# Patient Record
Sex: MALE | Race: WHITE | NOT HISPANIC OR LATINO | URBAN - METROPOLITAN AREA
[De-identification: names, ages, dates, MRNs, and addresses within clinical notes are randomized per-mention and may not be internally consistent; named-entity substitution may affect disease eponyms.]

---

## 2017-01-16 ENCOUNTER — INPATIENT (INPATIENT)
Facility: HOSPITAL | Age: 47
LOS: 6 days | Discharge: HOME | End: 2017-01-23
Attending: INTERNAL MEDICINE | Admitting: INTERNAL MEDICINE
Payer: MEDICAID

## 2017-01-16 VITALS
HEART RATE: 105 BPM | RESPIRATION RATE: 18 BRPM | DIASTOLIC BLOOD PRESSURE: 106 MMHG | TEMPERATURE: 98 F | SYSTOLIC BLOOD PRESSURE: 158 MMHG | OXYGEN SATURATION: 99 %

## 2017-01-16 DIAGNOSIS — R21 RASH AND OTHER NONSPECIFIC SKIN ERUPTION: ICD-10-CM

## 2017-01-16 DIAGNOSIS — K57.92 DIVERTICULITIS OF INTESTINE, PART UNSPECIFIED, WITHOUT PERFORATION OR ABSCESS WITHOUT BLEEDING: Chronic | ICD-10-CM

## 2017-01-16 DIAGNOSIS — Z98.89 OTHER SPECIFIED POSTPROCEDURAL STATES: Chronic | ICD-10-CM

## 2017-01-16 LAB
ALBUMIN SERPL ELPH-MCNC: 4.1 G/DL — SIGNIFICANT CHANGE UP (ref 3.3–5)
ALP SERPL-CCNC: 91 U/L — SIGNIFICANT CHANGE UP (ref 40–120)
ALT FLD-CCNC: 24 U/L — SIGNIFICANT CHANGE UP (ref 4–41)
AST SERPL-CCNC: 59 U/L — HIGH (ref 4–40)
BASE EXCESS BLDV CALC-SCNC: 3.6 MMOL/L — SIGNIFICANT CHANGE UP
BASOPHILS # BLD AUTO: 0.03 K/UL — SIGNIFICANT CHANGE UP (ref 0–0.2)
BASOPHILS NFR BLD AUTO: 0.4 % — SIGNIFICANT CHANGE UP (ref 0–2)
BILIRUB SERPL-MCNC: 0.4 MG/DL — SIGNIFICANT CHANGE UP (ref 0.2–1.2)
BLOOD GAS VENOUS - CREATININE: 0.77 MG/DL — SIGNIFICANT CHANGE UP (ref 0.5–1.3)
BUN SERPL-MCNC: 5 MG/DL — LOW (ref 7–23)
CALCIUM SERPL-MCNC: 8.9 MG/DL — SIGNIFICANT CHANGE UP (ref 8.4–10.5)
CHLORIDE BLDV-SCNC: 101 MMOL/L — SIGNIFICANT CHANGE UP (ref 96–108)
CHLORIDE SERPL-SCNC: 96 MMOL/L — LOW (ref 98–107)
CO2 SERPL-SCNC: 23 MMOL/L — SIGNIFICANT CHANGE UP (ref 22–31)
CREAT SERPL-MCNC: 0.8 MG/DL — SIGNIFICANT CHANGE UP (ref 0.5–1.3)
EOSINOPHIL # BLD AUTO: 0.17 K/UL — SIGNIFICANT CHANGE UP (ref 0–0.5)
EOSINOPHIL NFR BLD AUTO: 2.3 % — SIGNIFICANT CHANGE UP (ref 0–6)
GAS PNL BLDV: 136 MMOL/L — SIGNIFICANT CHANGE UP (ref 136–146)
GLUCOSE BLDV-MCNC: 73 — SIGNIFICANT CHANGE UP (ref 70–99)
GLUCOSE SERPL-MCNC: 75 MG/DL — SIGNIFICANT CHANGE UP (ref 70–99)
HCO3 BLDV-SCNC: 27 MMOL/L — SIGNIFICANT CHANGE UP (ref 20–27)
HCT VFR BLD CALC: 40.6 % — SIGNIFICANT CHANGE UP (ref 39–50)
HCT VFR BLDV CALC: 44.7 % — SIGNIFICANT CHANGE UP (ref 39–51)
HGB BLD-MCNC: 14.1 G/DL — SIGNIFICANT CHANGE UP (ref 13–17)
HGB BLDV-MCNC: 14.6 G/DL — SIGNIFICANT CHANGE UP (ref 13–17)
IMM GRANULOCYTES NFR BLD AUTO: 0.5 % — SIGNIFICANT CHANGE UP (ref 0–1.5)
LACTATE BLDV-MCNC: 2.2 MMOL/L — HIGH (ref 0.5–2)
LYMPHOCYTES # BLD AUTO: 2.31 K/UL — SIGNIFICANT CHANGE UP (ref 1–3.3)
LYMPHOCYTES # BLD AUTO: 30.8 % — SIGNIFICANT CHANGE UP (ref 13–44)
MCHC RBC-ENTMCNC: 33.3 PG — SIGNIFICANT CHANGE UP (ref 27–34)
MCHC RBC-ENTMCNC: 34.7 % — SIGNIFICANT CHANGE UP (ref 32–36)
MCV RBC AUTO: 96 FL — SIGNIFICANT CHANGE UP (ref 80–100)
MONOCYTES # BLD AUTO: 0.7 K/UL — SIGNIFICANT CHANGE UP (ref 0–0.9)
MONOCYTES NFR BLD AUTO: 9.3 % — SIGNIFICANT CHANGE UP (ref 2–14)
NEUTROPHILS # BLD AUTO: 4.25 K/UL — SIGNIFICANT CHANGE UP (ref 1.8–7.4)
NEUTROPHILS NFR BLD AUTO: 56.7 % — SIGNIFICANT CHANGE UP (ref 43–77)
PCO2 BLDV: 45 MMHG — SIGNIFICANT CHANGE UP (ref 41–51)
PH BLDV: 7.41 PH — SIGNIFICANT CHANGE UP (ref 7.32–7.43)
PLATELET # BLD AUTO: 201 K/UL — SIGNIFICANT CHANGE UP (ref 150–400)
PMV BLD: 10.5 FL — SIGNIFICANT CHANGE UP (ref 7–13)
PO2 BLDV: 48 MMHG — HIGH (ref 35–40)
POTASSIUM BLDV-SCNC: 3.9 MMOL/L — SIGNIFICANT CHANGE UP (ref 3.4–4.5)
POTASSIUM SERPL-MCNC: SIGNIFICANT CHANGE UP MMOL/L (ref 3.5–5.3)
POTASSIUM SERPL-SCNC: SIGNIFICANT CHANGE UP MMOL/L (ref 3.5–5.3)
PROT SERPL-MCNC: 7.9 G/DL — SIGNIFICANT CHANGE UP (ref 6–8.3)
RBC # BLD: 4.23 M/UL — SIGNIFICANT CHANGE UP (ref 4.2–5.8)
RBC # FLD: 12.8 % — SIGNIFICANT CHANGE UP (ref 10.3–14.5)
SAO2 % BLDV: 78.4 % — SIGNIFICANT CHANGE UP (ref 60–85)
SODIUM SERPL-SCNC: 136 MMOL/L — SIGNIFICANT CHANGE UP (ref 135–145)
WBC # BLD: 7.5 K/UL — SIGNIFICANT CHANGE UP (ref 3.8–10.5)
WBC # FLD AUTO: 7.5 K/UL — SIGNIFICANT CHANGE UP (ref 3.8–10.5)

## 2017-01-16 PROCEDURE — 99223 1ST HOSP IP/OBS HIGH 75: CPT | Mod: GC

## 2017-01-16 RX ORDER — ENOXAPARIN SODIUM 100 MG/ML
40 INJECTION SUBCUTANEOUS EVERY 24 HOURS
Qty: 0 | Refills: 0 | Status: DISCONTINUED | OUTPATIENT
Start: 2017-01-16 | End: 2017-01-23

## 2017-01-16 RX ADMIN — Medication 25 MILLIGRAM(S): at 18:42

## 2017-01-16 NOTE — ED PROVIDER NOTE - OBJECTIVE STATEMENT
46 year old male with no PMHx c/o BLE pruritic wounds for the past 2 weeks. Admits to having this in the past due to allergic reactions. The patient broke out in a rash that started distally on lower extremities and radiated proximally to thighs and on BUE. Pt has had allergy testing in the past which showed "many allergens". Recently switched body wash recently but has discontinued the use after rash started. Admits to penile swelling and redness. Admits to left ear pain for the past 2 days. Denies oral mucosa involvement, no rash on palms and soles of feet, no F/C/N/V, new sexual partners, joint pain, new foods. 46 year old male with PMHx of ETOH abuse c/o BLE pruritic wounds for the past 2 weeks. Admits to having this in the past due to allergic reactions. The patient broke out in a rash that started distally on lower extremities and radiated proximally to thighs and on BUE. Pt has had allergy testing in the past which showed "many allergens". Recently switched body wash recently but has discontinued the use after rash started. Admits to penile swelling and redness. Admits to left ear pain for the past 2 days. Denies oral mucosa involvement, no rash on palms and soles of feet, no F/C/N/V, new sexual partners, joint pain, new foods. 46 year old male with PMHx of ETOH abuse c/o BLE pruritic wounds for the past 2 weeks. Admits to having this in the past due to allergic reactions. The patient broke out in a rash that started distally on lower extremities 2 weeks ago and radiated proximally to thighs and on BUE 4 days ago. Pt has had allergy testing in the past which showed "many allergens". Recently switched body wash recently but has discontinued the use after rash started. Admits to penile swelling and redness for 4 days. Admits to left ear pain for the past 2 days. Denies oral mucosa involvement, no rash on palms and soles of feet, no F/C/N/V, new sexual partners, joint pain, new foods.

## 2017-01-16 NOTE — ED PROVIDER NOTE - PSYCHIATRIC NEGATIVE STATEMENT, MLM
hx of anxiety, was on meds including remeron but has not taken them in over 1 month for insurance reasons

## 2017-01-16 NOTE — ED ADULT TRIAGE NOTE - CHIEF COMPLAINT QUOTE
Pt c/o RLE excoriation and rash on arms, legs, and torso for 1-2 weeks.  Also c/o L ear having water in it and penis is sore and red.  States he's had this rash and excoriation previously 2-3 years ago and was diagnosed with fungal infection.  Wound red, open, with serous drainage.  Appears comfortable.

## 2017-01-16 NOTE — ED PROVIDER NOTE - SKIN, MLM
right foot has scaly excoriations and redness, diffuse papules on the arms and legs, some lesions on the abdomen

## 2017-01-16 NOTE — ED PROVIDER NOTE - MEDICAL DECISION MAKING DETAILS
scaly excoriations to left foot with redness and diffuse papular rash, maybe ID reaction from superinfected fungal/scaly lesion, also phimosis, will check labs, derm consult.

## 2017-01-16 NOTE — ED PROVIDER NOTE - ATTENDING CONTRIBUTION TO CARE
ELTON Hess MD: Patient seen and evaluated, presents to the ED with rash on the right foot that is very itchy and now is infected, very painful too. Now pt has diffuse papules over his legs and arms that are not itchy. He denies fever, n, v. He had something similar in the past and was treated for fungal but then he was told that it was not fungal and was given a steroid cream that worked. Pt is daily drinker, withdraws with shaking and anxiety when he does not drink but does not get hallucinations, DTs or seizures. PE lungs clear, s1s2, abd SNT, scaly lesions with superinfection in the right foot and papular rash on the legs and arms and some lesions on the abdomen. Penis; Foreskin is not retractable but urethral meatus is visible, fissure on the foreskin. IMP probably superinfected fungal infection with ID reaction and some alcohol withdrawal. Will treat with IV AB, Librium, derm to see patient in the morning and decide on antifungal if needed, pt with 30 yr hx of alcohol use and US that showed liver disease in the past.

## 2017-01-17 DIAGNOSIS — Z41.8 ENCOUNTER FOR OTHER PROCEDURES FOR PURPOSES OTHER THAN REMEDYING HEALTH STATE: ICD-10-CM

## 2017-01-17 DIAGNOSIS — R63.8 OTHER SYMPTOMS AND SIGNS CONCERNING FOOD AND FLUID INTAKE: ICD-10-CM

## 2017-01-17 DIAGNOSIS — F17.200 NICOTINE DEPENDENCE, UNSPECIFIED, UNCOMPLICATED: ICD-10-CM

## 2017-01-17 DIAGNOSIS — R21 RASH AND OTHER NONSPECIFIC SKIN ERUPTION: ICD-10-CM

## 2017-01-17 DIAGNOSIS — F10.10 ALCOHOL ABUSE, UNCOMPLICATED: ICD-10-CM

## 2017-01-17 LAB
ALBUMIN SERPL ELPH-MCNC: 4.2 G/DL — SIGNIFICANT CHANGE UP (ref 3.3–5)
ALBUMIN SERPL ELPH-MCNC: 4.2 G/DL — SIGNIFICANT CHANGE UP (ref 3.3–5)
ALP SERPL-CCNC: 105 U/L — SIGNIFICANT CHANGE UP (ref 40–120)
ALP SERPL-CCNC: 105 U/L — SIGNIFICANT CHANGE UP (ref 40–120)
ALT FLD-CCNC: 15 U/L — SIGNIFICANT CHANGE UP (ref 4–41)
ALT FLD-CCNC: 15 U/L — SIGNIFICANT CHANGE UP (ref 4–41)
AMPHET UR-MCNC: NEGATIVE — SIGNIFICANT CHANGE UP
AST SERPL-CCNC: 32 U/L — SIGNIFICANT CHANGE UP (ref 4–40)
AST SERPL-CCNC: 32 U/L — SIGNIFICANT CHANGE UP (ref 4–40)
BARBITURATES UR SCN-MCNC: NEGATIVE — SIGNIFICANT CHANGE UP
BASOPHILS # BLD AUTO: 0.02 K/UL — SIGNIFICANT CHANGE UP (ref 0–0.2)
BASOPHILS NFR BLD AUTO: 0.3 % — SIGNIFICANT CHANGE UP (ref 0–2)
BENZODIAZ UR-MCNC: POSITIVE — SIGNIFICANT CHANGE UP
BILIRUB DIRECT SERPL-MCNC: 0.2 MG/DL — SIGNIFICANT CHANGE UP (ref 0.1–0.2)
BILIRUB SERPL-MCNC: 0.9 MG/DL — SIGNIFICANT CHANGE UP (ref 0.2–1.2)
BILIRUB SERPL-MCNC: 0.9 MG/DL — SIGNIFICANT CHANGE UP (ref 0.2–1.2)
BUN SERPL-MCNC: 5 MG/DL — LOW (ref 7–23)
CALCIUM SERPL-MCNC: 9.2 MG/DL — SIGNIFICANT CHANGE UP (ref 8.4–10.5)
CANNABINOIDS UR-MCNC: POSITIVE — SIGNIFICANT CHANGE UP
CHLORIDE SERPL-SCNC: 99 MMOL/L — SIGNIFICANT CHANGE UP (ref 98–107)
CO2 SERPL-SCNC: 26 MMOL/L — SIGNIFICANT CHANGE UP (ref 22–31)
COCAINE METAB.OTHER UR-MCNC: NEGATIVE — SIGNIFICANT CHANGE UP
CREAT SERPL-MCNC: 0.83 MG/DL — SIGNIFICANT CHANGE UP (ref 0.5–1.3)
EOSINOPHIL # BLD AUTO: 0.01 K/UL — SIGNIFICANT CHANGE UP (ref 0–0.5)
EOSINOPHIL NFR BLD AUTO: 0.2 % — SIGNIFICANT CHANGE UP (ref 0–6)
ETHANOL BLD-MCNC: < 10 MG/DL — SIGNIFICANT CHANGE UP
GLUCOSE SERPL-MCNC: 90 MG/DL — SIGNIFICANT CHANGE UP (ref 70–99)
HCT VFR BLD CALC: 45.8 % — SIGNIFICANT CHANGE UP (ref 39–50)
HGB BLD-MCNC: 15.4 G/DL — SIGNIFICANT CHANGE UP (ref 13–17)
IMM GRANULOCYTES NFR BLD AUTO: 0.7 % — SIGNIFICANT CHANGE UP (ref 0–1.5)
LACTATE SERPL-SCNC: 0.9 MMOL/L — SIGNIFICANT CHANGE UP (ref 0.5–2)
LYMPHOCYTES # BLD AUTO: 0.89 K/UL — LOW (ref 1–3.3)
LYMPHOCYTES # BLD AUTO: 14.6 % — SIGNIFICANT CHANGE UP (ref 13–44)
MAGNESIUM SERPL-MCNC: 1.8 MG/DL — SIGNIFICANT CHANGE UP (ref 1.6–2.6)
MCHC RBC-ENTMCNC: 33 PG — SIGNIFICANT CHANGE UP (ref 27–34)
MCHC RBC-ENTMCNC: 33.6 % — SIGNIFICANT CHANGE UP (ref 32–36)
MCV RBC AUTO: 98.1 FL — SIGNIFICANT CHANGE UP (ref 80–100)
METHADONE UR-MCNC: NEGATIVE — SIGNIFICANT CHANGE UP
MONOCYTES # BLD AUTO: 0.11 K/UL — SIGNIFICANT CHANGE UP (ref 0–0.9)
MONOCYTES NFR BLD AUTO: 1.8 % — LOW (ref 2–14)
NEUTROPHILS # BLD AUTO: 5.04 K/UL — SIGNIFICANT CHANGE UP (ref 1.8–7.4)
NEUTROPHILS NFR BLD AUTO: 82.4 % — HIGH (ref 43–77)
OPIATES UR-MCNC: NEGATIVE — SIGNIFICANT CHANGE UP
OXYCODONE UR-MCNC: NEGATIVE — SIGNIFICANT CHANGE UP
PCP UR-MCNC: NEGATIVE — SIGNIFICANT CHANGE UP
PHOSPHATE SERPL-MCNC: 2.7 MG/DL — SIGNIFICANT CHANGE UP (ref 2.5–4.5)
PLATELET # BLD AUTO: 200 K/UL — SIGNIFICANT CHANGE UP (ref 150–400)
PMV BLD: 11 FL — SIGNIFICANT CHANGE UP (ref 7–13)
POTASSIUM SERPL-MCNC: 4.7 MMOL/L — SIGNIFICANT CHANGE UP (ref 3.5–5.3)
POTASSIUM SERPL-SCNC: 4.7 MMOL/L — SIGNIFICANT CHANGE UP (ref 3.5–5.3)
PROT SERPL-MCNC: 7.8 G/DL — SIGNIFICANT CHANGE UP (ref 6–8.3)
PROT SERPL-MCNC: 7.8 G/DL — SIGNIFICANT CHANGE UP (ref 6–8.3)
RBC # BLD: 4.67 M/UL — SIGNIFICANT CHANGE UP (ref 4.2–5.8)
RBC # FLD: 13.2 % — SIGNIFICANT CHANGE UP (ref 10.3–14.5)
SODIUM SERPL-SCNC: 139 MMOL/L — SIGNIFICANT CHANGE UP (ref 135–145)
SPECIMEN SOURCE: SIGNIFICANT CHANGE UP
SPECIMEN SOURCE: SIGNIFICANT CHANGE UP
WBC # BLD: 6.11 K/UL — SIGNIFICANT CHANGE UP (ref 3.8–10.5)
WBC # FLD AUTO: 6.11 K/UL — SIGNIFICANT CHANGE UP (ref 3.8–10.5)

## 2017-01-17 PROCEDURE — 99233 SBSQ HOSP IP/OBS HIGH 50: CPT

## 2017-01-17 PROCEDURE — 99223 1ST HOSP IP/OBS HIGH 75: CPT | Mod: GC

## 2017-01-17 PROCEDURE — 99222 1ST HOSP IP/OBS MODERATE 55: CPT | Mod: GC

## 2017-01-17 RX ORDER — SODIUM CHLORIDE 9 MG/ML
1000 INJECTION, SOLUTION INTRAVENOUS
Qty: 0 | Refills: 0 | Status: DISCONTINUED | OUTPATIENT
Start: 2017-01-17 | End: 2017-01-20

## 2017-01-17 RX ORDER — DIPHENHYDRAMINE HCL 50 MG
25 CAPSULE ORAL EVERY 6 HOURS
Qty: 0 | Refills: 0 | Status: DISCONTINUED | OUTPATIENT
Start: 2017-01-17 | End: 2017-01-23

## 2017-01-17 RX ORDER — MUPIROCIN 20 MG/G
1 OINTMENT TOPICAL
Qty: 0 | Refills: 0 | Status: DISCONTINUED | OUTPATIENT
Start: 2017-01-17 | End: 2017-01-23

## 2017-01-17 RX ORDER — SODIUM CHLORIDE 9 MG/ML
1000 INJECTION, SOLUTION INTRAVENOUS
Qty: 0 | Refills: 0 | Status: DISCONTINUED | OUTPATIENT
Start: 2017-01-17 | End: 2017-01-17

## 2017-01-17 RX ORDER — NICOTINE POLACRILEX 2 MG
1 GUM BUCCAL DAILY
Qty: 0 | Refills: 0 | Status: DISCONTINUED | OUTPATIENT
Start: 2017-01-17 | End: 2017-01-23

## 2017-01-17 RX ORDER — SODIUM CHLORIDE 9 MG/ML
1000 INJECTION INTRAMUSCULAR; INTRAVENOUS; SUBCUTANEOUS
Qty: 0 | Refills: 0 | Status: DISCONTINUED | OUTPATIENT
Start: 2017-01-17 | End: 2017-01-18

## 2017-01-17 RX ORDER — ACETAMINOPHEN 500 MG
650 TABLET ORAL EVERY 6 HOURS
Qty: 0 | Refills: 0 | Status: DISCONTINUED | OUTPATIENT
Start: 2017-01-17 | End: 2017-01-23

## 2017-01-17 RX ADMIN — Medication 2 MILLIGRAM(S): at 03:41

## 2017-01-17 RX ADMIN — Medication 40 MILLIGRAM(S): at 10:15

## 2017-01-17 RX ADMIN — SODIUM CHLORIDE 100 MILLILITER(S): 9 INJECTION, SOLUTION INTRAVENOUS at 01:54

## 2017-01-17 RX ADMIN — Medication 1.5 MILLIGRAM(S): at 22:17

## 2017-01-17 RX ADMIN — ENOXAPARIN SODIUM 40 MILLIGRAM(S): 100 INJECTION SUBCUTANEOUS at 01:25

## 2017-01-17 RX ADMIN — Medication 2 MILLIGRAM(S): at 01:25

## 2017-01-17 RX ADMIN — SODIUM CHLORIDE 100 MILLILITER(S): 9 INJECTION INTRAMUSCULAR; INTRAVENOUS; SUBCUTANEOUS at 22:18

## 2017-01-17 RX ADMIN — Medication 2 MILLIGRAM(S): at 07:39

## 2017-01-17 RX ADMIN — Medication 2 MILLIGRAM(S): at 11:00

## 2017-01-17 RX ADMIN — Medication 60 MILLIGRAM(S): at 01:51

## 2017-01-17 RX ADMIN — Medication 2 MILLIGRAM(S): at 14:43

## 2017-01-17 RX ADMIN — Medication 1 TABLET(S): at 11:00

## 2017-01-17 RX ADMIN — Medication 25 MILLIGRAM(S): at 01:25

## 2017-01-17 RX ADMIN — Medication 40 MILLIGRAM(S): at 16:51

## 2017-01-17 RX ADMIN — Medication 1 APPLICATION(S): at 22:19

## 2017-01-17 RX ADMIN — Medication 2 MILLIGRAM(S): at 19:09

## 2017-01-17 NOTE — H&P ADULT. - HISTORY OF PRESENT ILLNESS
47 y/o M with hx of ETOH abuse complains of body rash for 2 weeks. Pt has had this in the past. The patient broke out in a rash that started on lower extremities 2 weeks ago and radiated proximally to thighs reached the upper extremities 4 days ago. Pt has had allergy testing in the past which showed he was allergic to a lot of things. Recently switched body wash recently but has discontinued the use after rash started. Admits to penile swelling and redness for 4 days. Denies oral mucosa involvement, no rash on palms and soles of feet, no f/c, no n/v/d, no sexual active right now     In ED, vitals wnl.   Labs significant for mild transaminitis, lactate 2.1. 47 y/o M with hx of ETOH abuse complains of body rash for 2 weeks. Pt has had this in the past. The patient broke out in a rash that started on lower extremities 2 weeks ago and radiated proximally to thighs reached the upper extremities 4 days ago. Pt has had allergy testing in the past which showed he was allergic to a lot of things. Recently switched body wash recently but has discontinued the use after rash started. Admits to penile swelling and redness for 4 days. Denies oral mucosa involvement, no rash on palms and soles of feet, no f/c, no n/v/d, no sexual active right now. Pt says he has extreme pruritis on his feet, and has scratched excessively to the point of bleeding.     In ED, vitals wnl.   Labs significant for mild transaminitis, lactate 2.1. 45 y/o M with hx of ETOH abuse complains of body rash for 2 weeks. Pt has had this in the past. The patient broke out in a rash that started on lower extremities 2 weeks ago and radiated proximally to thighs reached the upper extremities 4 days ago. Pt has had allergy testing in the past which showed he was allergic to a lot of things. Recently switched body wash recently but has discontinued the use after rash started. Admits to penile swelling and redness for 4 days. Denies oral mucosa involvement, no rash on palms and soles of feet, no f/c, no n/v/d, no sexual active right now. Pt says he has extreme pruritis on his feet, and has scratched excessively to the point of bleeding.     Pt denies any seizures on ICU stay for alcohol withdrawal. Has been hospitalized in the past however.     In ED, vitals wnl.   Labs significant for mild transaminitis, lactate 2.1. 47 y/o M with hx of ETOH abuse complains of body rash for 2 weeks. Pt has had this in the past. The patient broke out in a rash that started on lower extremities 2 weeks ago and radiated proximally to thighs reached the upper extremities 4 days ago. Pt has had allergy testing in the past which showed he was allergic to a lot of things. Recently switched body wash recently but has discontinued the use after rash started. Admits to penile swelling and redness for 4 days. Denies oral mucosa involvement, no rash on palms and soles of feet, no f/c, no n/v/d, no sexual active right now. Pt says he has extreme pruritis on his feet, and has scratched excessively to the point of bleeding. Also admits to penile swelling with rash.     Pt denies any seizures on ICU stay for alcohol withdrawal. Has been hospitalized in the past however.     In ED, vitals wnl.   Labs significant for mild transaminitis, lactate 2.1. 47 y/o M with hx of ETOH abuse complains of body rash for 2 weeks. Pt has had this in the past. The patient broke out in a rash that started on lower extremities 2 weeks ago and radiated proximally to thighs reached the upper extremities 4 days ago. Pt has had allergy testing in the past which showed he was allergic to a lot of things. Recently switched body wash recently but has discontinued the use after rash started. Admits to penile swelling and redness for 4 days. Denies oral mucosa involvement, no rash on palms and soles of feet, no f/c, no n/v/d, no sexual active right now. Pt says he has extreme pruritis on his feet (R significantly > L), and has scratched excessively to the point of bleeding. Small rashes also on B/L upper extremities.  Also admits to penile swelling with rash.     Pt denies any seizures on ICU stay for alcohol withdrawal. Has been hospitalized in the past however.     In ED, vitals wnl.   Labs significant for mild transaminitis, lactate 2.1.

## 2017-01-17 NOTE — H&P ADULT. - FAMILY HISTORY
Father  Still living? Unknown  Family history of stomach cancer, Age at diagnosis: Age Unknown     Uncle  Still living? Unknown  Family history of stomach cancer, Age at diagnosis: Age Unknown

## 2017-01-17 NOTE — H&P ADULT. - ASSESSMENT
47 y/o M with hx of ETOH abuse complains of body rash for 2 weeks 2/2 to possible allergic reactions, with excoriations of R foot with possible superimposed cellulitis.

## 2017-01-17 NOTE — H&P ADULT. - PROBLEM SELECTOR PLAN 1
-possible allergic reactions given hx of allergies  -derm consulted  -will give benadryl PRN for itching  -given excoriation and redness on R foot, will tx for superimposed cellulitis with doxycycline -possible allergic reactions given hx of allergies  -derm consulted  -will give benadryl PRN for itching; one dose of IV solumedrol 60 mg  -given no fevers, or leukocytosis, will not add abx for now -possible allergic reactions given hx of allergies  - may be due to contact, also reported new bodywash usage for 1 day 2 weeks prior  - no history of food sensitivity  - no eosinophilia  -derm consulted  -will give benadryl PRN for itching; IV solumedrol 60 mg x 3 doses (then re-evaluate)  -given no fevers, or leukocytosis, will not add abx for now (prescribed doxycycline and levofloxacin n the ED); consider ID consult if necessary  -percocet PRN

## 2017-01-17 NOTE — H&P ADULT. - PROBLEM SELECTOR PLAN 3
-lovenox    diet: regular    Dispo: per social work. - not motivated to quit  - nicotine patch prescribed  - continue to encourage smoking cessation if receptive

## 2017-01-17 NOTE — H&P ADULT. - SKIN COMMENTS
R excoriations, redness. fine maculopapular rash on 4 extremities, faintly in trunk. R excoriations, redness. fine maculopapular rash on 4 extremities, faintly in trunk - (most pronounced on dorsum of R-pedal area)

## 2017-01-17 NOTE — H&P ADULT. - PROBLEM SELECTOR PLAN 2
-pt hospitalized in past for alcohol withdrawal, but no hx of severe symptoms  -will put on ativan taper, and ativan PRN JESSEWA protocol  -banana bag, MV  -social work consult -pt hospitalized in past for alcohol withdrawal, but no hx of severe symptoms  -will put on ativan taper, and ativan PRN [facility] protocol  -banana bag, MV/FA/Thiamine; maintenance IVF thereafter  -social work consult

## 2017-01-18 LAB
HBV CORE AB SER-ACNC: NONREACTIVE — SIGNIFICANT CHANGE UP
HIV1 AG SER QL: SIGNIFICANT CHANGE UP
HIV1+2 AB SPEC QL: SIGNIFICANT CHANGE UP
T PALLIDUM AB TITR SER: NEGATIVE — SIGNIFICANT CHANGE UP

## 2017-01-18 PROCEDURE — 99233 SBSQ HOSP IP/OBS HIGH 50: CPT

## 2017-01-18 RX ORDER — THIAMINE MONONITRATE (VIT B1) 100 MG
100 TABLET ORAL DAILY
Qty: 0 | Refills: 0 | Status: DISCONTINUED | OUTPATIENT
Start: 2017-01-18 | End: 2017-01-20

## 2017-01-18 RX ORDER — FOLIC ACID 0.8 MG
1 TABLET ORAL DAILY
Qty: 0 | Refills: 0 | Status: DISCONTINUED | OUTPATIENT
Start: 2017-01-18 | End: 2017-01-23

## 2017-01-18 RX ADMIN — MUPIROCIN 1 APPLICATION(S): 20 OINTMENT TOPICAL at 18:12

## 2017-01-18 RX ADMIN — Medication 1.5 MILLIGRAM(S): at 01:53

## 2017-01-18 RX ADMIN — Medication 1.5 MILLIGRAM(S): at 06:29

## 2017-01-18 RX ADMIN — Medication 100 MILLIGRAM(S): at 13:20

## 2017-01-18 RX ADMIN — Medication 1.5 MILLIGRAM(S): at 10:27

## 2017-01-18 RX ADMIN — Medication 1.5 MILLIGRAM(S): at 18:09

## 2017-01-18 RX ADMIN — Medication 1.5 MILLIGRAM(S): at 13:20

## 2017-01-18 RX ADMIN — Medication 1 MILLIGRAM(S): at 13:20

## 2017-01-18 RX ADMIN — MUPIROCIN 1 APPLICATION(S): 20 OINTMENT TOPICAL at 06:30

## 2017-01-18 RX ADMIN — Medication 1 APPLICATION(S): at 10:28

## 2017-01-18 RX ADMIN — Medication 1 TABLET(S): at 12:48

## 2017-01-18 RX ADMIN — Medication 1 APPLICATION(S): at 21:58

## 2017-01-18 RX ADMIN — Medication 1 MILLIGRAM(S): at 21:57

## 2017-01-19 PROCEDURE — 99232 SBSQ HOSP IP/OBS MODERATE 35: CPT

## 2017-01-19 PROCEDURE — 99233 SBSQ HOSP IP/OBS HIGH 50: CPT

## 2017-01-19 PROCEDURE — 99223 1ST HOSP IP/OBS HIGH 75: CPT

## 2017-01-19 RX ORDER — THIAMINE MONONITRATE (VIT B1) 100 MG
500 TABLET ORAL THREE TIMES A DAY
Qty: 0 | Refills: 0 | Status: COMPLETED | OUTPATIENT
Start: 2017-01-19 | End: 2017-01-22

## 2017-01-19 RX ORDER — HALOPERIDOL DECANOATE 100 MG/ML
5 INJECTION INTRAMUSCULAR EVERY 6 HOURS
Qty: 0 | Refills: 0 | Status: DISCONTINUED | OUTPATIENT
Start: 2017-01-19 | End: 2017-01-23

## 2017-01-19 RX ADMIN — Medication 1 APPLICATION(S): at 21:34

## 2017-01-19 RX ADMIN — Medication 2.5 MILLIGRAM(S): at 18:03

## 2017-01-19 RX ADMIN — Medication 1 TABLET(S): at 11:44

## 2017-01-19 RX ADMIN — Medication 1 MILLIGRAM(S): at 09:39

## 2017-01-19 RX ADMIN — Medication 2.5 MILLIGRAM(S): at 21:41

## 2017-01-19 RX ADMIN — Medication 1 MILLIGRAM(S): at 06:12

## 2017-01-19 RX ADMIN — Medication 2 MILLIGRAM(S): at 04:32

## 2017-01-19 RX ADMIN — Medication 25 MILLIGRAM(S): at 21:41

## 2017-01-19 RX ADMIN — Medication 105 MILLIGRAM(S): at 22:59

## 2017-01-19 RX ADMIN — Medication 1 MILLIGRAM(S): at 11:45

## 2017-01-19 RX ADMIN — Medication 100 MILLIGRAM(S): at 11:45

## 2017-01-19 RX ADMIN — Medication 1 PATCH: at 11:45

## 2017-01-19 RX ADMIN — Medication 1 MILLIGRAM(S): at 14:13

## 2017-01-19 RX ADMIN — MUPIROCIN 1 APPLICATION(S): 20 OINTMENT TOPICAL at 17:20

## 2017-01-19 RX ADMIN — MUPIROCIN 1 APPLICATION(S): 20 OINTMENT TOPICAL at 06:12

## 2017-01-19 RX ADMIN — Medication 1 APPLICATION(S): at 09:38

## 2017-01-19 RX ADMIN — Medication 2.5 MILLIGRAM(S): at 15:16

## 2017-01-19 RX ADMIN — Medication 1 MILLIGRAM(S): at 01:21

## 2017-01-20 LAB
M TB TUBERC IFN-G BLD QL: -0.01 IU/ML — SIGNIFICANT CHANGE UP
M TB TUBERC IFN-G BLD QL: 0.02 IU/ML — SIGNIFICANT CHANGE UP
M TB TUBERC IFN-G BLD QL: NEGATIVE — SIGNIFICANT CHANGE UP
MITOGEN IGNF BCKGRD COR BLD-ACNC: 6.38 IU/ML — SIGNIFICANT CHANGE UP

## 2017-01-20 PROCEDURE — 99232 SBSQ HOSP IP/OBS MODERATE 35: CPT

## 2017-01-20 RX ADMIN — Medication 105 MILLIGRAM(S): at 14:52

## 2017-01-20 RX ADMIN — Medication 2.5 MILLIGRAM(S): at 05:20

## 2017-01-20 RX ADMIN — Medication 1 APPLICATION(S): at 21:49

## 2017-01-20 RX ADMIN — Medication 1 TABLET(S): at 12:59

## 2017-01-20 RX ADMIN — MUPIROCIN 1 APPLICATION(S): 20 OINTMENT TOPICAL at 05:20

## 2017-01-20 RX ADMIN — Medication 2.5 MILLIGRAM(S): at 11:33

## 2017-01-20 RX ADMIN — Medication 2 MILLIGRAM(S): at 21:50

## 2017-01-20 RX ADMIN — Medication 2 MILLIGRAM(S): at 18:40

## 2017-01-20 RX ADMIN — Medication 25 MILLIGRAM(S): at 05:22

## 2017-01-20 RX ADMIN — Medication 1 APPLICATION(S): at 11:34

## 2017-01-20 RX ADMIN — Medication 105 MILLIGRAM(S): at 21:49

## 2017-01-20 RX ADMIN — Medication 105 MILLIGRAM(S): at 05:19

## 2017-01-20 RX ADMIN — MUPIROCIN 1 APPLICATION(S): 20 OINTMENT TOPICAL at 18:40

## 2017-01-20 RX ADMIN — Medication 25 MILLIGRAM(S): at 21:49

## 2017-01-20 RX ADMIN — Medication 2 MILLIGRAM(S): at 14:52

## 2017-01-20 RX ADMIN — Medication 1 MILLIGRAM(S): at 13:00

## 2017-01-20 RX ADMIN — Medication 2.5 MILLIGRAM(S): at 02:30

## 2017-01-20 RX ADMIN — Medication 1 PATCH: at 13:01

## 2017-01-21 LAB
BACTERIA BLD CULT: SIGNIFICANT CHANGE UP
BACTERIA BLD CULT: SIGNIFICANT CHANGE UP

## 2017-01-21 PROCEDURE — 99233 SBSQ HOSP IP/OBS HIGH 50: CPT

## 2017-01-21 RX ADMIN — Medication 25 MILLIGRAM(S): at 06:12

## 2017-01-21 RX ADMIN — Medication 1 MILLIGRAM(S): at 22:26

## 2017-01-21 RX ADMIN — Medication 2 MILLIGRAM(S): at 03:15

## 2017-01-21 RX ADMIN — Medication 1 MILLIGRAM(S): at 13:01

## 2017-01-21 RX ADMIN — Medication 25 MILLIGRAM(S): at 21:09

## 2017-01-21 RX ADMIN — Medication 1 TABLET(S): at 13:01

## 2017-01-21 RX ADMIN — Medication 1 MILLIGRAM(S): at 18:37

## 2017-01-21 RX ADMIN — Medication 1 APPLICATION(S): at 21:09

## 2017-01-21 RX ADMIN — MUPIROCIN 1 APPLICATION(S): 20 OINTMENT TOPICAL at 06:08

## 2017-01-21 RX ADMIN — Medication 105 MILLIGRAM(S): at 22:26

## 2017-01-21 RX ADMIN — Medication 2 MILLIGRAM(S): at 06:08

## 2017-01-21 RX ADMIN — Medication 105 MILLIGRAM(S): at 06:08

## 2017-01-21 RX ADMIN — MUPIROCIN 1 APPLICATION(S): 20 OINTMENT TOPICAL at 18:38

## 2017-01-21 RX ADMIN — Medication 1 APPLICATION(S): at 10:24

## 2017-01-21 RX ADMIN — Medication 105 MILLIGRAM(S): at 14:24

## 2017-01-21 RX ADMIN — Medication 1 MILLIGRAM(S): at 14:29

## 2017-01-21 RX ADMIN — Medication 2 MILLIGRAM(S): at 10:23

## 2017-01-22 PROCEDURE — 99233 SBSQ HOSP IP/OBS HIGH 50: CPT

## 2017-01-22 RX ADMIN — Medication 1 TABLET(S): at 11:02

## 2017-01-22 RX ADMIN — Medication 0.5 MILLIGRAM(S): at 14:35

## 2017-01-22 RX ADMIN — Medication 1 MILLIGRAM(S): at 02:15

## 2017-01-22 RX ADMIN — Medication 105 MILLIGRAM(S): at 06:10

## 2017-01-22 RX ADMIN — Medication 105 MILLIGRAM(S): at 14:36

## 2017-01-22 RX ADMIN — Medication 1 APPLICATION(S): at 11:02

## 2017-01-22 RX ADMIN — Medication 25 MILLIGRAM(S): at 19:21

## 2017-01-22 RX ADMIN — MUPIROCIN 1 APPLICATION(S): 20 OINTMENT TOPICAL at 06:11

## 2017-01-22 RX ADMIN — Medication 0.5 MILLIGRAM(S): at 19:22

## 2017-01-22 RX ADMIN — MUPIROCIN 1 APPLICATION(S): 20 OINTMENT TOPICAL at 19:22

## 2017-01-22 RX ADMIN — Medication 25 MILLIGRAM(S): at 06:11

## 2017-01-22 RX ADMIN — Medication 1 MILLIGRAM(S): at 11:01

## 2017-01-22 RX ADMIN — Medication 1 MILLIGRAM(S): at 11:02

## 2017-01-22 RX ADMIN — Medication 0.5 MILLIGRAM(S): at 22:35

## 2017-01-22 RX ADMIN — Medication 1 MILLIGRAM(S): at 06:10

## 2017-01-22 RX ADMIN — Medication 1 APPLICATION(S): at 22:35

## 2017-01-23 ENCOUNTER — TRANSCRIPTION ENCOUNTER (OUTPATIENT)
Age: 47
End: 2017-01-23

## 2017-01-23 VITALS
RESPIRATION RATE: 18 BRPM | OXYGEN SATURATION: 100 % | TEMPERATURE: 98 F | DIASTOLIC BLOOD PRESSURE: 77 MMHG | HEART RATE: 85 BPM | SYSTOLIC BLOOD PRESSURE: 116 MMHG

## 2017-01-23 LAB
ALBUMIN SERPL ELPH-MCNC: 3.8 G/DL — SIGNIFICANT CHANGE UP (ref 3.3–5)
ALP SERPL-CCNC: 67 U/L — SIGNIFICANT CHANGE UP (ref 40–120)
ALT FLD-CCNC: 16 U/L — SIGNIFICANT CHANGE UP (ref 4–41)
AST SERPL-CCNC: 16 U/L — SIGNIFICANT CHANGE UP (ref 4–40)
BILIRUB SERPL-MCNC: 0.2 MG/DL — SIGNIFICANT CHANGE UP (ref 0.2–1.2)
BUN SERPL-MCNC: 18 MG/DL — SIGNIFICANT CHANGE UP (ref 7–23)
CALCIUM SERPL-MCNC: 9.4 MG/DL — SIGNIFICANT CHANGE UP (ref 8.4–10.5)
CHLORIDE SERPL-SCNC: 106 MMOL/L — SIGNIFICANT CHANGE UP (ref 98–107)
CO2 SERPL-SCNC: 21 MMOL/L — LOW (ref 22–31)
CREAT SERPL-MCNC: 0.82 MG/DL — SIGNIFICANT CHANGE UP (ref 0.5–1.3)
GLUCOSE SERPL-MCNC: 86 MG/DL — SIGNIFICANT CHANGE UP (ref 70–99)
HCT VFR BLD CALC: 43.2 % — SIGNIFICANT CHANGE UP (ref 39–50)
HGB BLD-MCNC: 14.3 G/DL — SIGNIFICANT CHANGE UP (ref 13–17)
MCHC RBC-ENTMCNC: 32.9 PG — SIGNIFICANT CHANGE UP (ref 27–34)
MCHC RBC-ENTMCNC: 33.1 % — SIGNIFICANT CHANGE UP (ref 32–36)
MCV RBC AUTO: 99.3 FL — SIGNIFICANT CHANGE UP (ref 80–100)
PLATELET # BLD AUTO: 240 K/UL — SIGNIFICANT CHANGE UP (ref 150–400)
PMV BLD: 11.1 FL — SIGNIFICANT CHANGE UP (ref 7–13)
POTASSIUM SERPL-MCNC: 4 MMOL/L — SIGNIFICANT CHANGE UP (ref 3.5–5.3)
POTASSIUM SERPL-SCNC: 4 MMOL/L — SIGNIFICANT CHANGE UP (ref 3.5–5.3)
PROT SERPL-MCNC: 6.8 G/DL — SIGNIFICANT CHANGE UP (ref 6–8.3)
RBC # BLD: 4.35 M/UL — SIGNIFICANT CHANGE UP (ref 4.2–5.8)
RBC # FLD: 13.1 % — SIGNIFICANT CHANGE UP (ref 10.3–14.5)
SODIUM SERPL-SCNC: 141 MMOL/L — SIGNIFICANT CHANGE UP (ref 135–145)
WBC # BLD: 8.21 K/UL — SIGNIFICANT CHANGE UP (ref 3.8–10.5)
WBC # FLD AUTO: 8.21 K/UL — SIGNIFICANT CHANGE UP (ref 3.8–10.5)

## 2017-01-23 PROCEDURE — 99239 HOSP IP/OBS DSCHRG MGMT >30: CPT

## 2017-01-23 RX ORDER — THIAMINE MONONITRATE (VIT B1) 100 MG
100 TABLET ORAL DAILY
Qty: 0 | Refills: 0 | Status: DISCONTINUED | OUTPATIENT
Start: 2017-01-23 | End: 2017-01-23

## 2017-01-23 RX ORDER — MUPIROCIN 20 MG/G
1 OINTMENT TOPICAL
Qty: 1 | Refills: 0 | OUTPATIENT
Start: 2017-01-23 | End: 2017-02-06

## 2017-01-23 RX ORDER — DIPHENHYDRAMINE HCL 50 MG
1 CAPSULE ORAL
Qty: 0 | Refills: 0 | COMMUNITY
Start: 2017-01-23

## 2017-01-23 RX ORDER — DIPHENHYDRAMINE HCL 50 MG
1 CAPSULE ORAL
Qty: 12 | Refills: 0 | OUTPATIENT
Start: 2017-01-23 | End: 2017-01-26

## 2017-01-23 RX ORDER — MUPIROCIN 20 MG/G
1 OINTMENT TOPICAL
Qty: 0 | Refills: 0 | COMMUNITY
Start: 2017-01-23

## 2017-01-23 RX ADMIN — Medication 0.5 MILLIGRAM(S): at 02:29

## 2017-01-23 RX ADMIN — Medication 100 MILLIGRAM(S): at 14:05

## 2017-01-23 RX ADMIN — Medication 1 MILLIGRAM(S): at 12:31

## 2017-01-23 RX ADMIN — Medication 1 APPLICATION(S): at 10:07

## 2017-01-23 RX ADMIN — MUPIROCIN 1 APPLICATION(S): 20 OINTMENT TOPICAL at 06:05

## 2017-01-23 RX ADMIN — Medication 1 TABLET(S): at 12:31

## 2017-01-23 RX ADMIN — Medication 0.5 MILLIGRAM(S): at 10:06

## 2017-01-23 RX ADMIN — Medication 0.5 MILLIGRAM(S): at 06:05

## 2017-01-23 NOTE — DISCHARGE NOTE ADULT - CARE PROVIDERS DIRECT ADDRESSES
[email or web address]

## 2017-01-23 NOTE — DISCHARGE NOTE ADULT - MEDICATION SUMMARY - MEDICATIONS TO STOP TAKING
I will STOP taking the medications listed below when I get home from the hospital:    triamcinolone 0.1% topical ointment  -- 1 application on skin once a day

## 2017-01-23 NOTE — DISCHARGE NOTE ADULT - PATIENT PORTAL LINK FT
“You can access the [facility] Patient Portal, offered by [facility], by registering with the following website: [email or web address]”

## 2017-01-23 NOTE — DISCHARGE NOTE ADULT - PLAN OF CARE
Resolution of rash You were seen by the dermatology team and started on topical steriods. Please continue topical medications as prescribed. Please follow up with dermatology outpatient within 2 weeks of discharge. Abstain from alcohol. You were seen by the dermatology team and started on topical steroids. Please continue topical medications as prescribed. Please follow up with dermatology outpatient within 2 weeks of discharge.

## 2017-01-23 NOTE — PROVIDER CONTACT NOTE (OTHER) - SITUATION
Patient IV site symptomatic, not flushing. Patient is refusing iv insertion at this time. he states we are not using it, he doesn't need one.

## 2017-01-23 NOTE — DISCHARGE NOTE ADULT - MEDICATION SUMMARY - MEDICATIONS TO TAKE
I will START or STAY ON the medications listed below when I get home from the hospital:    mirtazapine 15 mg oral tablet  -- 1 tab(s) by mouth once a day (at bedtime)  -- Indication: For Sleep Aide    diphenhydrAMINE 25 mg oral capsule  -- 1 cap(s) by mouth every 6 hours, As needed, Rash and/or Itching  -- Indication: For Rash    mupirocin 2% topical ointment  -- 1 application on skin 2 times a day  -- Indication: For Rash    clobetasol 0.05% topical ointment  -- 1 application on skin 2 times a day Stop after 13 days  -- Indication: For Rash    lactobacillus acidophilus oral capsule  -- 1 tab(s) by mouth 2 times a day  -- Indication: For prophylaxis    Multiple Vitamins oral tablet  -- 1 tab(s) by mouth once a day  -- Indication: For Vitamin    folic acid 1 mg oral tablet  -- 1 tab(s) by mouth once a day  -- Indication: For Vitamin    thiamine 100 mg oral tablet  -- 1 tab(s) by mouth once a day  -- Indication: For Vitamin

## 2017-01-23 NOTE — DISCHARGE NOTE ADULT - HOSPITAL COURSE
47 y/o M with hx of ETOH abuse, diverticulitis arrived with complaint of  body rash for 2 weeks secondary to possible allergic reactions, with excoriations of right foot with possible superimposed cellulitis. Given no fevers or leukocytosis antibiotics  held. Dermatology consulted and made recommendations for topical creams. Given excoriation and redness on right  foot he was treated for  superimposed cellulitis with doxycycline. Follow up with Dermatology Dr. Booker within 2 weeks of discharge.     ETOH abuse  Patient hospitalized in the past for alcohol withdrawal. Psychiatry consulted and he was placed  on an ativan taper. Patient is to continue multivitamins, thiamine and folate. He was offered a list of alcohol rehab programs but declined.    Patient is medically stable for discharge home.

## 2017-01-23 NOTE — DISCHARGE NOTE ADULT - CARE PLAN
Principal Discharge DX:	Rash  Goal:	Resolution of rash  Instructions for follow-up, activity and diet:	You were seen by the dermatology team and started on topical steriods. Please continue topical medications as prescribed. Please follow up with dermatology outpatient within 2 weeks of discharge.  Secondary Diagnosis:	ETOH abuse  Instructions for follow-up, activity and diet:	Abstain from alcohol. Principal Discharge DX:	Rash  Goal:	Resolution of rash  Instructions for follow-up, activity and diet:	You were seen by the dermatology team and started on topical steroids. Please continue topical medications as prescribed. Please follow up with dermatology outpatient within 2 weeks of discharge.  Secondary Diagnosis:	ETOH abuse  Instructions for follow-up, activity and diet:	Abstain from alcohol.

## 2017-02-08 ENCOUNTER — INPATIENT (INPATIENT)
Facility: HOSPITAL | Age: 47
LOS: 20 days | Discharge: OTHER INSTITUTION | End: 2017-03-01
Attending: HOSPITALIST | Admitting: HOSPITALIST
Payer: MEDICAID

## 2017-02-08 VITALS
HEART RATE: 98 BPM | TEMPERATURE: 99 F | DIASTOLIC BLOOD PRESSURE: 107 MMHG | SYSTOLIC BLOOD PRESSURE: 153 MMHG | RESPIRATION RATE: 17 BRPM | OXYGEN SATURATION: 96 %

## 2017-02-08 DIAGNOSIS — F10.230 ALCOHOL DEPENDENCE WITH WITHDRAWAL, UNCOMPLICATED: ICD-10-CM

## 2017-02-08 DIAGNOSIS — R21 RASH AND OTHER NONSPECIFIC SKIN ERUPTION: ICD-10-CM

## 2017-02-08 DIAGNOSIS — K57.92 DIVERTICULITIS OF INTESTINE, PART UNSPECIFIED, WITHOUT PERFORATION OR ABSCESS WITHOUT BLEEDING: Chronic | ICD-10-CM

## 2017-02-08 DIAGNOSIS — Z98.89 OTHER SPECIFIED POSTPROCEDURAL STATES: Chronic | ICD-10-CM

## 2017-02-08 DIAGNOSIS — E51.2 WERNICKE'S ENCEPHALOPATHY: ICD-10-CM

## 2017-02-08 DIAGNOSIS — F10.10 ALCOHOL ABUSE, UNCOMPLICATED: ICD-10-CM

## 2017-02-08 LAB
ALBUMIN SERPL ELPH-MCNC: 4.1 G/DL — SIGNIFICANT CHANGE UP (ref 3.3–5)
ALP SERPL-CCNC: 91 U/L — SIGNIFICANT CHANGE UP (ref 40–120)
ALT FLD-CCNC: 56 U/L — HIGH (ref 4–41)
AST SERPL-CCNC: 85 U/L — HIGH (ref 4–40)
BASOPHILS # BLD AUTO: 0.02 K/UL — SIGNIFICANT CHANGE UP (ref 0–0.2)
BASOPHILS NFR BLD AUTO: 0.4 % — SIGNIFICANT CHANGE UP (ref 0–2)
BILIRUB SERPL-MCNC: 0.2 MG/DL — SIGNIFICANT CHANGE UP (ref 0.2–1.2)
BUN SERPL-MCNC: 5 MG/DL — LOW (ref 7–23)
CALCIUM SERPL-MCNC: 8.9 MG/DL — SIGNIFICANT CHANGE UP (ref 8.4–10.5)
CHLORIDE SERPL-SCNC: 99 MMOL/L — SIGNIFICANT CHANGE UP (ref 98–107)
CO2 SERPL-SCNC: 25 MMOL/L — SIGNIFICANT CHANGE UP (ref 22–31)
CREAT SERPL-MCNC: 0.76 MG/DL — SIGNIFICANT CHANGE UP (ref 0.5–1.3)
EOSINOPHIL # BLD AUTO: 0.26 K/UL — SIGNIFICANT CHANGE UP (ref 0–0.5)
EOSINOPHIL NFR BLD AUTO: 4.9 % — SIGNIFICANT CHANGE UP (ref 0–6)
GLUCOSE SERPL-MCNC: 77 MG/DL — SIGNIFICANT CHANGE UP (ref 70–99)
HCT VFR BLD CALC: 40.7 % — SIGNIFICANT CHANGE UP (ref 39–50)
HGB BLD-MCNC: 14.1 G/DL — SIGNIFICANT CHANGE UP (ref 13–17)
IMM GRANULOCYTES NFR BLD AUTO: 0.4 % — SIGNIFICANT CHANGE UP (ref 0–1.5)
LYMPHOCYTES # BLD AUTO: 2.45 K/UL — SIGNIFICANT CHANGE UP (ref 1–3.3)
LYMPHOCYTES # BLD AUTO: 46.1 % — HIGH (ref 13–44)
MAGNESIUM SERPL-MCNC: 1.9 MG/DL — SIGNIFICANT CHANGE UP (ref 1.6–2.6)
MCHC RBC-ENTMCNC: 32.6 PG — SIGNIFICANT CHANGE UP (ref 27–34)
MCHC RBC-ENTMCNC: 34.6 % — SIGNIFICANT CHANGE UP (ref 32–36)
MCV RBC AUTO: 94.2 FL — SIGNIFICANT CHANGE UP (ref 80–100)
MONOCYTES # BLD AUTO: 0.53 K/UL — SIGNIFICANT CHANGE UP (ref 0–0.9)
MONOCYTES NFR BLD AUTO: 10 % — SIGNIFICANT CHANGE UP (ref 2–14)
NEUTROPHILS # BLD AUTO: 2.04 K/UL — SIGNIFICANT CHANGE UP (ref 1.8–7.4)
NEUTROPHILS NFR BLD AUTO: 38.2 % — LOW (ref 43–77)
PHOSPHATE SERPL-MCNC: 4.6 MG/DL — HIGH (ref 2.5–4.5)
PLATELET # BLD AUTO: 160 K/UL — SIGNIFICANT CHANGE UP (ref 150–400)
PMV BLD: 11.2 FL — SIGNIFICANT CHANGE UP (ref 7–13)
POTASSIUM SERPL-MCNC: 4.2 MMOL/L — SIGNIFICANT CHANGE UP (ref 3.5–5.3)
POTASSIUM SERPL-SCNC: 4.2 MMOL/L — SIGNIFICANT CHANGE UP (ref 3.5–5.3)
PROT SERPL-MCNC: 7.4 G/DL — SIGNIFICANT CHANGE UP (ref 6–8.3)
RBC # BLD: 4.32 M/UL — SIGNIFICANT CHANGE UP (ref 4.2–5.8)
RBC # FLD: 13.2 % — SIGNIFICANT CHANGE UP (ref 10.3–14.5)
SODIUM SERPL-SCNC: 144 MMOL/L — SIGNIFICANT CHANGE UP (ref 135–145)
WBC # BLD: 5.32 K/UL — SIGNIFICANT CHANGE UP (ref 3.8–10.5)
WBC # FLD AUTO: 5.32 K/UL — SIGNIFICANT CHANGE UP (ref 3.8–10.5)

## 2017-02-08 PROCEDURE — 99223 1ST HOSP IP/OBS HIGH 75: CPT

## 2017-02-08 PROCEDURE — 71010: CPT | Mod: 26

## 2017-02-08 RX ORDER — SODIUM CHLORIDE 9 MG/ML
1000 INJECTION, SOLUTION INTRAVENOUS
Qty: 0 | Refills: 0 | Status: COMPLETED | OUTPATIENT
Start: 2017-02-08 | End: 2017-02-08

## 2017-02-08 RX ORDER — FOLIC ACID 0.8 MG
1 TABLET ORAL DAILY
Qty: 0 | Refills: 0 | Status: DISCONTINUED | OUTPATIENT
Start: 2017-02-08 | End: 2017-02-11

## 2017-02-08 RX ORDER — SODIUM CHLORIDE 9 MG/ML
1000 INJECTION, SOLUTION INTRAVENOUS
Qty: 0 | Refills: 0 | Status: DISCONTINUED | OUTPATIENT
Start: 2017-02-08 | End: 2017-02-08

## 2017-02-08 RX ORDER — THIAMINE MONONITRATE (VIT B1) 100 MG
500 TABLET ORAL EVERY 8 HOURS
Qty: 0 | Refills: 0 | Status: COMPLETED | OUTPATIENT
Start: 2017-02-08 | End: 2017-02-10

## 2017-02-08 RX ADMIN — Medication 2 MILLIGRAM(S): at 22:30

## 2017-02-08 RX ADMIN — Medication 50 MILLIGRAM(S): at 22:46

## 2017-02-08 RX ADMIN — SODIUM CHLORIDE 250 MILLILITER(S): 9 INJECTION, SOLUTION INTRAVENOUS at 21:17

## 2017-02-08 NOTE — ED ADULT TRIAGE NOTE - CHIEF COMPLAINT QUOTE
Patient c/o painful rash to b/l UE, LE and back. Discharged from [facility] 1/23/17 after admission for same with dermatology follow up. However, patients medicaid has not gone through and was unable to follow up or get prescriptions. Admits drinking 4-5 beers around 11am today. Patient c/o painful rash to b/l UE, LE and back. Discharged from [facility] 1/23/17 after admission for same with dermatology follow up. However, patients medicaid has not gone through and was unable to follow up or get prescriptions. History of ETOH abuse- Admits drinking 4-5 beers around 11am today.

## 2017-02-08 NOTE — H&P ADULT. - PROBLEM SELECTOR PLAN 3
- Etiology unclear  - Seen by derm on prior visit. Pt states rash has gotten worse. WIll continue creams recommended by derm and reconsult derm in am for further recommendation  - Would avoid PO or IV benadryl while pt receiving ativan to avoid oversedation. Will monitor clinically and try topical management - Etiology unclear  - Seen by derm on prior visit. Pt states rash has gotten worse and no improvement with topical treamtent recommended by derm   - Would reconsult derm in am for further recommendation  - Would avoid PO or IV benadryl while pt receiving ativan to avoid oversedation. Will monitor clinically and try topical management if needed

## 2017-02-08 NOTE — H&P ADULT. - PROBLEM SELECTOR PLAN 2
- positive nystagmus and ataxia concerning for Wernicke's encephalopathy  - Will start high dose thiamine and monitor clinically

## 2017-02-08 NOTE — ED ADULT NURSE NOTE - OBJECTIVE STATEMENT
Pt received to Intake rm 6 aaox3 ambulatory c/o itching painful rash.    Dry peeling rash with scabs noted to Back and Bilateral upper and lower extremities.  Pt reports this has been on and off for 18 years with this episode beginning 4 weeks ago.  Pt reports ETOH abuse, states "I drink a 12 pack (of beer) a day."  Pt reports his last drink was at noon today.  Pt is anxious and tremulous CIWA score of 11 as noted, though itching may be attributed to rash.  Pt reports smoking 8-10 cigarettes per day but denies need for nicotine patch.  Pt denies hx of seizures or hallucinations.  Pt reports allergy to zosyn and clindamycin.  Will continue to monitor.

## 2017-02-08 NOTE — H&P ADULT. - NEGATIVE PSYCHIATRIC SYMPTOMS
no suicidal ideation/no depression/no auditory hallucinations/no visual hallucinations/no insomnia/no memory loss/no paranoia

## 2017-02-08 NOTE — H&P ADULT. - ASSESSMENT
47 yo M with h/o EtOH use, chronic rash p/w worsening rash and EtOH withdrawal. Pt is high risk ciwa. Also concerned for Wernicke's encephalopathy.

## 2017-02-08 NOTE — H&P ADULT. - NEUROLOGICAL DETAILS
normal strength/cranial nerves intact/sensation intact/alert and oriented x 3 sensation intact/alert and oriented x 3/normal strength

## 2017-02-08 NOTE — ED PROVIDER NOTE - OBJECTIVE STATEMENT
47 yo man w/ h/o etoh abuse and withdrawal p/w rash. States he has had rash for 11 yrs, recently worsening/expanding. Was recently admitted for rash w/ ?superinfection/cellulitis. Was discharged 2 wks ago, but has been unable to obtain his prescriptions or follow up w/ derm because of insurance issues. Returns today because rash has spread to entire back (previously on only LE and UE, expanding from hands). States this is very itchy. Also states he had several beers this morning and now feels as though he is withdrawing from alcohol. Denies fever, n/v/d, pain, oral involvement of rash.

## 2017-02-08 NOTE — PROVIDER CONTACT NOTE (MEDICATION) - ASSESSMENT
As per ED provider note, pt is 47 yo M w/ PMH of etoh abuse and withdrawal. Pt pw rash. Pt was discharged from hospital 2 wks ago. Medication history completed. Pt states he was unable to  any meds that were prescribed during discharge because of his insurance. Pt was given the remainder of his inpatient mupirocin ointment and clobetasol ointment upon discharged and stated that they helped. However, as previously stated, pt did not  a new supply.

## 2017-02-08 NOTE — H&P ADULT. - HISTORY OF PRESENT ILLNESS
47 yo M with h/o EtOH use, chronic rash p/w worsening rash and EtOH withdrawal. Pt recently admitted with similar complaints and discharged with topical treatments but says he has not noticed any improvement. He thinks rash has gotten more diffuse including legs and back, continues to itch. Hands are painful. He denies any fevers or chills. He has no known allergies and has not changed his diet. He denies exposure to bed bugs. He has two roommates, neither have any rashes. He has not taken any new meds besides creams prescribed last admission. He has no GI or  symptoms. No SOB or congestion, no airway compromise.    Also of note, pt thinks he is withdrawing from alcohol. Feels tremulous, slight headache, photophobia. His last drink was 2 days ago. Says he usually drinks 12 beers per day. He has never had withdrawal seizures or DT. Last admission pt given ativan ativan taper, monitored on ciwa protocol. Also given high dose IV thiamine.       ED VS: 153/107  98  98.6  17  96% on RA  Pt was given valium 10mg IV and banana bag started

## 2017-02-08 NOTE — ED PROVIDER NOTE - ATTENDING CONTRIBUTION TO CARE
I performed a face to face evaluation of this patient and performed a full history and physical examination on the patient.  I agree with the resident's history, physical examination, and plan of the patient.  47 yo M with h/o EtOH abuse, chronic rash of b/l hands and feet, recently admitted to [facility] with derm and ID consult for rash exacerbation, was started on topical tx by derm and d/c with outpt f/u. Patient reports having no insurance and no financial means to pay for rx or derm appt. Now presents with swelling and increased redness of b/l hands and feet, worried for possible developing infection.

## 2017-02-08 NOTE — ED PROVIDER NOTE - SKIN, MLM
maculopapular rash overlying b/l hands and feet w/ extension proximally on arms and areas of convalescence, also involving entire back

## 2017-02-09 LAB
ALBUMIN SERPL ELPH-MCNC: 3.9 G/DL — SIGNIFICANT CHANGE UP (ref 3.3–5)
ALP SERPL-CCNC: 85 U/L — SIGNIFICANT CHANGE UP (ref 40–120)
ALT FLD-CCNC: 62 U/L — HIGH (ref 4–41)
APAP SERPL-MCNC: < 15 UG/ML — LOW (ref 15–25)
AST SERPL-CCNC: 112 U/L — HIGH (ref 4–40)
BARBITURATES MEASUREMENT: NEGATIVE — SIGNIFICANT CHANGE UP
BASOPHILS # BLD AUTO: 0.01 K/UL — SIGNIFICANT CHANGE UP (ref 0–0.2)
BASOPHILS NFR BLD AUTO: 0.2 % — SIGNIFICANT CHANGE UP (ref 0–2)
BENZODIAZ SERPL-MCNC: POSITIVE — SIGNIFICANT CHANGE UP
BILIRUB SERPL-MCNC: 0.2 MG/DL — SIGNIFICANT CHANGE UP (ref 0.2–1.2)
BUN SERPL-MCNC: 6 MG/DL — LOW (ref 7–23)
CALCIUM SERPL-MCNC: 8.7 MG/DL — SIGNIFICANT CHANGE UP (ref 8.4–10.5)
CHLORIDE SERPL-SCNC: 99 MMOL/L — SIGNIFICANT CHANGE UP (ref 98–107)
CO2 SERPL-SCNC: 24 MMOL/L — SIGNIFICANT CHANGE UP (ref 22–31)
CREAT SERPL-MCNC: 0.83 MG/DL — SIGNIFICANT CHANGE UP (ref 0.5–1.3)
EOSINOPHIL # BLD AUTO: 0.18 K/UL — SIGNIFICANT CHANGE UP (ref 0–0.5)
EOSINOPHIL NFR BLD AUTO: 3.5 % — SIGNIFICANT CHANGE UP (ref 0–6)
ETHANOL BLD-MCNC: 56 MG/DL — HIGH
GLUCOSE SERPL-MCNC: 74 MG/DL — SIGNIFICANT CHANGE UP (ref 70–99)
HCT VFR BLD CALC: 43.4 % — SIGNIFICANT CHANGE UP (ref 39–50)
HGB BLD-MCNC: 14.9 G/DL — SIGNIFICANT CHANGE UP (ref 13–17)
IMM GRANULOCYTES NFR BLD AUTO: 0.6 % — SIGNIFICANT CHANGE UP (ref 0–1.5)
LYMPHOCYTES # BLD AUTO: 1.51 K/UL — SIGNIFICANT CHANGE UP (ref 1–3.3)
LYMPHOCYTES # BLD AUTO: 29.6 % — SIGNIFICANT CHANGE UP (ref 13–44)
MAGNESIUM SERPL-MCNC: 1.8 MG/DL — SIGNIFICANT CHANGE UP (ref 1.6–2.6)
MCHC RBC-ENTMCNC: 33 PG — SIGNIFICANT CHANGE UP (ref 27–34)
MCHC RBC-ENTMCNC: 34.3 % — SIGNIFICANT CHANGE UP (ref 32–36)
MCV RBC AUTO: 96.2 FL — SIGNIFICANT CHANGE UP (ref 80–100)
MONOCYTES # BLD AUTO: 0.42 K/UL — SIGNIFICANT CHANGE UP (ref 0–0.9)
MONOCYTES NFR BLD AUTO: 8.2 % — SIGNIFICANT CHANGE UP (ref 2–14)
NEUTROPHILS # BLD AUTO: 2.95 K/UL — SIGNIFICANT CHANGE UP (ref 1.8–7.4)
NEUTROPHILS NFR BLD AUTO: 57.9 % — SIGNIFICANT CHANGE UP (ref 43–77)
PHOSPHATE SERPL-MCNC: 3.6 MG/DL — SIGNIFICANT CHANGE UP (ref 2.5–4.5)
PLATELET # BLD AUTO: 157 K/UL — SIGNIFICANT CHANGE UP (ref 150–400)
PMV BLD: 11.5 FL — SIGNIFICANT CHANGE UP (ref 7–13)
POTASSIUM SERPL-MCNC: 5.1 MMOL/L — SIGNIFICANT CHANGE UP (ref 3.5–5.3)
POTASSIUM SERPL-SCNC: 5.1 MMOL/L — SIGNIFICANT CHANGE UP (ref 3.5–5.3)
PROT SERPL-MCNC: 7.3 G/DL — SIGNIFICANT CHANGE UP (ref 6–8.3)
RBC # BLD: 4.51 M/UL — SIGNIFICANT CHANGE UP (ref 4.2–5.8)
RBC # FLD: 13.6 % — SIGNIFICANT CHANGE UP (ref 10.3–14.5)
SALICYLATES SERPL-MCNC: < 5 MG/DL — LOW (ref 15–30)
SODIUM SERPL-SCNC: 144 MMOL/L — SIGNIFICANT CHANGE UP (ref 135–145)
WBC # BLD: 5.1 K/UL — SIGNIFICANT CHANGE UP (ref 3.8–10.5)
WBC # FLD AUTO: 5.1 K/UL — SIGNIFICANT CHANGE UP (ref 3.8–10.5)

## 2017-02-09 RX ORDER — SODIUM CHLORIDE 9 MG/ML
1000 INJECTION INTRAMUSCULAR; INTRAVENOUS; SUBCUTANEOUS
Qty: 0 | Refills: 0 | Status: DISCONTINUED | OUTPATIENT
Start: 2017-02-09 | End: 2017-02-13

## 2017-02-09 RX ORDER — MUPIROCIN 20 MG/G
1 OINTMENT TOPICAL
Qty: 0 | Refills: 0 | Status: DISCONTINUED | OUTPATIENT
Start: 2017-02-09 | End: 2017-02-10

## 2017-02-09 RX ORDER — MUPIROCIN 20 MG/G
1 OINTMENT TOPICAL
Qty: 0 | Refills: 0 | Status: DISCONTINUED | OUTPATIENT
Start: 2017-02-09 | End: 2017-02-09

## 2017-02-09 RX ADMIN — Medication 1 MILLIGRAM(S): at 11:11

## 2017-02-09 RX ADMIN — Medication 210 MILLIGRAM(S): at 23:42

## 2017-02-09 RX ADMIN — Medication 3 MILLIGRAM(S): at 17:31

## 2017-02-09 RX ADMIN — Medication 1 TABLET(S): at 11:11

## 2017-02-09 RX ADMIN — Medication 210 MILLIGRAM(S): at 13:18

## 2017-02-09 RX ADMIN — Medication 2 MILLIGRAM(S): at 22:20

## 2017-02-09 RX ADMIN — Medication 210 MILLIGRAM(S): at 07:00

## 2017-02-09 RX ADMIN — MUPIROCIN 1 APPLICATION(S): 20 OINTMENT TOPICAL at 17:31

## 2017-02-09 RX ADMIN — Medication 2 MILLIGRAM(S): at 06:50

## 2017-02-09 RX ADMIN — Medication 2 MILLIGRAM(S): at 03:15

## 2017-02-09 RX ADMIN — Medication 1 APPLICATION(S): at 19:00

## 2017-02-09 RX ADMIN — Medication 3 MILLIGRAM(S): at 13:35

## 2017-02-09 RX ADMIN — Medication 2 MILLIGRAM(S): at 10:39

## 2017-02-09 RX ADMIN — Medication 3 MILLIGRAM(S): at 22:19

## 2017-02-09 RX ADMIN — SODIUM CHLORIDE 75 MILLILITER(S): 9 INJECTION INTRAMUSCULAR; INTRAVENOUS; SUBCUTANEOUS at 10:39

## 2017-02-09 NOTE — PATIENT PROFILE ADULT. - IF HCP IS NOT ON CHART, IDENTIFY THE PERSONS NAME AND PHONE NUMBER CONTACTED TO BRING IN DOCUMENT
left message with Patricia Hermosillo to bring in HCP spoke with pt's daughter, will bring in HCP tomorrow 2/13/17

## 2017-02-09 NOTE — PATIENT PROFILE ADULT. - NS TRANSFER PATIENT BELONGINGS
Cell Phone/PDA (specify)/Clothing/Android [employer], wallet with cash 1x $100, 3x $20, 1x $10, 1x $5, 8x  1. Total  of $183/Jewelry/Money (specify)

## 2017-02-10 LAB
ALBUMIN SERPL ELPH-MCNC: 3.5 G/DL — SIGNIFICANT CHANGE UP (ref 3.3–5)
ALP SERPL-CCNC: 95 U/L — SIGNIFICANT CHANGE UP (ref 40–120)
ALT FLD-CCNC: 58 U/L — HIGH (ref 4–41)
AST SERPL-CCNC: 75 U/L — HIGH (ref 4–40)
BILIRUB SERPL-MCNC: 0.7 MG/DL — SIGNIFICANT CHANGE UP (ref 0.2–1.2)
BUN SERPL-MCNC: 6 MG/DL — LOW (ref 7–23)
CALCIUM SERPL-MCNC: 9.1 MG/DL — SIGNIFICANT CHANGE UP (ref 8.4–10.5)
CHLORIDE SERPL-SCNC: 100 MMOL/L — SIGNIFICANT CHANGE UP (ref 98–107)
CO2 SERPL-SCNC: 22 MMOL/L — SIGNIFICANT CHANGE UP (ref 22–31)
CREAT SERPL-MCNC: 0.78 MG/DL — SIGNIFICANT CHANGE UP (ref 0.5–1.3)
GLUCOSE SERPL-MCNC: 72 MG/DL — SIGNIFICANT CHANGE UP (ref 70–99)
MAGNESIUM SERPL-MCNC: 1.6 MG/DL — SIGNIFICANT CHANGE UP (ref 1.6–2.6)
PHOSPHATE SERPL-MCNC: 3 MG/DL — SIGNIFICANT CHANGE UP (ref 2.5–4.5)
POTASSIUM SERPL-MCNC: 3.6 MMOL/L — SIGNIFICANT CHANGE UP (ref 3.5–5.3)
POTASSIUM SERPL-SCNC: 3.6 MMOL/L — SIGNIFICANT CHANGE UP (ref 3.5–5.3)
PROT SERPL-MCNC: 6.6 G/DL — SIGNIFICANT CHANGE UP (ref 6–8.3)
SODIUM SERPL-SCNC: 141 MMOL/L — SIGNIFICANT CHANGE UP (ref 135–145)

## 2017-02-10 PROCEDURE — 99233 SBSQ HOSP IP/OBS HIGH 50: CPT | Mod: GC

## 2017-02-10 PROCEDURE — 99233 SBSQ HOSP IP/OBS HIGH 50: CPT

## 2017-02-10 RX ADMIN — SODIUM CHLORIDE 75 MILLILITER(S): 9 INJECTION INTRAMUSCULAR; INTRAVENOUS; SUBCUTANEOUS at 09:08

## 2017-02-10 RX ADMIN — Medication 210 MILLIGRAM(S): at 23:48

## 2017-02-10 RX ADMIN — Medication 2.5 MILLIGRAM(S): at 17:06

## 2017-02-10 RX ADMIN — MUPIROCIN 1 APPLICATION(S): 20 OINTMENT TOPICAL at 05:21

## 2017-02-10 RX ADMIN — Medication 2.5 MILLIGRAM(S): at 13:18

## 2017-02-10 RX ADMIN — Medication 210 MILLIGRAM(S): at 13:32

## 2017-02-10 RX ADMIN — Medication 3 MILLIGRAM(S): at 05:21

## 2017-02-10 RX ADMIN — Medication 1 APPLICATION(S): at 05:21

## 2017-02-10 RX ADMIN — Medication 1 TABLET(S): at 11:33

## 2017-02-10 RX ADMIN — Medication 2.5 MILLIGRAM(S): at 21:06

## 2017-02-10 RX ADMIN — MUPIROCIN 1 APPLICATION(S): 20 OINTMENT TOPICAL at 17:07

## 2017-02-10 RX ADMIN — SODIUM CHLORIDE 75 MILLILITER(S): 9 INJECTION INTRAMUSCULAR; INTRAVENOUS; SUBCUTANEOUS at 16:20

## 2017-02-10 RX ADMIN — Medication 210 MILLIGRAM(S): at 07:22

## 2017-02-10 RX ADMIN — Medication 3 MILLIGRAM(S): at 01:11

## 2017-02-10 RX ADMIN — Medication 3 MILLIGRAM(S): at 09:07

## 2017-02-10 RX ADMIN — Medication 1 MILLIGRAM(S): at 11:33

## 2017-02-10 RX ADMIN — Medication 1 APPLICATION(S): at 17:07

## 2017-02-11 LAB
ALBUMIN SERPL ELPH-MCNC: 3.8 G/DL — SIGNIFICANT CHANGE UP (ref 3.3–5)
ALP SERPL-CCNC: 108 U/L — SIGNIFICANT CHANGE UP (ref 40–120)
ALT FLD-CCNC: 51 U/L — HIGH (ref 4–41)
AST SERPL-CCNC: 54 U/L — HIGH (ref 4–40)
BASE EXCESS BLDA CALC-SCNC: -1.1 MMOL/L — SIGNIFICANT CHANGE UP
BILIRUB SERPL-MCNC: 0.7 MG/DL — SIGNIFICANT CHANGE UP (ref 0.2–1.2)
BUN SERPL-MCNC: 8 MG/DL — SIGNIFICANT CHANGE UP (ref 7–23)
CALCIUM SERPL-MCNC: 9.7 MG/DL — SIGNIFICANT CHANGE UP (ref 8.4–10.5)
CHLORIDE SERPL-SCNC: 103 MMOL/L — SIGNIFICANT CHANGE UP (ref 98–107)
CO2 SERPL-SCNC: 23 MMOL/L — SIGNIFICANT CHANGE UP (ref 22–31)
CREAT SERPL-MCNC: 0.87 MG/DL — SIGNIFICANT CHANGE UP (ref 0.5–1.3)
GLUCOSE SERPL-MCNC: 185 MG/DL — HIGH (ref 70–99)
HCO3 BLDA-SCNC: 24 MMOL/L — SIGNIFICANT CHANGE UP (ref 22–26)
MAGNESIUM SERPL-MCNC: 1.5 MG/DL — LOW (ref 1.6–2.6)
PCO2 BLDA: 35 MMHG — SIGNIFICANT CHANGE UP (ref 35–48)
PH BLDA: 7.42 PH — SIGNIFICANT CHANGE UP (ref 7.35–7.45)
PHOSPHATE SERPL-MCNC: 3.3 MG/DL — SIGNIFICANT CHANGE UP (ref 2.5–4.5)
PO2 BLDA: 257 MMHG — HIGH (ref 83–108)
POTASSIUM SERPL-MCNC: 3.4 MMOL/L — LOW (ref 3.5–5.3)
POTASSIUM SERPL-SCNC: 3.4 MMOL/L — LOW (ref 3.5–5.3)
PROT SERPL-MCNC: 7.1 G/DL — SIGNIFICANT CHANGE UP (ref 6–8.3)
SAO2 % BLDA: 99.8 % — HIGH (ref 95–99)
SODIUM SERPL-SCNC: 141 MMOL/L — SIGNIFICANT CHANGE UP (ref 135–145)

## 2017-02-11 PROCEDURE — 90792 PSYCH DIAG EVAL W/MED SRVCS: CPT

## 2017-02-11 PROCEDURE — 99291 CRITICAL CARE FIRST HOUR: CPT | Mod: 25

## 2017-02-11 PROCEDURE — 99233 SBSQ HOSP IP/OBS HIGH 50: CPT

## 2017-02-11 PROCEDURE — 31500 INSERT EMERGENCY AIRWAY: CPT

## 2017-02-11 PROCEDURE — 71010: CPT | Mod: 26

## 2017-02-11 RX ORDER — PROPOFOL 10 MG/ML
150 INJECTION, EMULSION INTRAVENOUS ONCE
Qty: 0 | Refills: 0 | Status: COMPLETED | OUTPATIENT
Start: 2017-02-11 | End: 2017-02-11

## 2017-02-11 RX ORDER — PROPOFOL 10 MG/ML
50 INJECTION, EMULSION INTRAVENOUS
Qty: 1000 | Refills: 0 | Status: DISCONTINUED | OUTPATIENT
Start: 2017-02-11 | End: 2017-02-13

## 2017-02-11 RX ORDER — PHENOBARBITAL 60 MG
130 TABLET ORAL ONCE
Qty: 0 | Refills: 0 | Status: DISCONTINUED | OUTPATIENT
Start: 2017-02-11 | End: 2017-02-11

## 2017-02-11 RX ORDER — ENOXAPARIN SODIUM 100 MG/ML
40 INJECTION SUBCUTANEOUS EVERY 24 HOURS
Qty: 0 | Refills: 0 | Status: DISCONTINUED | OUTPATIENT
Start: 2017-02-11 | End: 2017-03-01

## 2017-02-11 RX ORDER — MIDAZOLAM HYDROCHLORIDE 1 MG/ML
8 INJECTION, SOLUTION INTRAMUSCULAR; INTRAVENOUS ONCE
Qty: 0 | Refills: 0 | Status: DISCONTINUED | OUTPATIENT
Start: 2017-02-11 | End: 2017-02-11

## 2017-02-11 RX ORDER — PROPOFOL 10 MG/ML
50 INJECTION, EMULSION INTRAVENOUS
Qty: 500 | Refills: 0 | Status: DISCONTINUED | OUTPATIENT
Start: 2017-02-11 | End: 2017-02-11

## 2017-02-11 RX ORDER — THIAMINE MONONITRATE (VIT B1) 100 MG
500 TABLET ORAL DAILY
Qty: 0 | Refills: 0 | Status: DISCONTINUED | OUTPATIENT
Start: 2017-02-11 | End: 2017-02-14

## 2017-02-11 RX ORDER — PROPOFOL 10 MG/ML
5 INJECTION, EMULSION INTRAVENOUS
Qty: 500 | Refills: 0 | Status: DISCONTINUED | OUTPATIENT
Start: 2017-02-11 | End: 2017-02-11

## 2017-02-11 RX ORDER — MIDAZOLAM HYDROCHLORIDE 1 MG/ML
6 INJECTION, SOLUTION INTRAMUSCULAR; INTRAVENOUS
Qty: 100 | Refills: 0 | Status: DISCONTINUED | OUTPATIENT
Start: 2017-02-11 | End: 2017-02-13

## 2017-02-11 RX ORDER — PHENOBARBITAL 60 MG
130 TABLET ORAL
Qty: 0 | Refills: 0 | Status: DISCONTINUED | OUTPATIENT
Start: 2017-02-11 | End: 2017-02-11

## 2017-02-11 RX ADMIN — Medication 2 MILLIGRAM(S): at 07:47

## 2017-02-11 RX ADMIN — Medication 130 MILLIGRAM(S): at 18:55

## 2017-02-11 RX ADMIN — Medication 130 MILLIGRAM(S): at 19:15

## 2017-02-11 RX ADMIN — Medication 2.5 MILLIGRAM(S): at 01:36

## 2017-02-11 RX ADMIN — Medication 1 MILLIGRAM(S): at 12:21

## 2017-02-11 RX ADMIN — Medication 2.5 MILLIGRAM(S): at 05:19

## 2017-02-11 RX ADMIN — Medication 130 MILLIGRAM(S): at 16:22

## 2017-02-11 RX ADMIN — Medication 1 APPLICATION(S): at 23:39

## 2017-02-11 RX ADMIN — Medication 1 APPLICATION(S): at 23:40

## 2017-02-11 RX ADMIN — Medication 1 APPLICATION(S): at 05:20

## 2017-02-11 RX ADMIN — Medication 105 MILLIGRAM(S): at 13:01

## 2017-02-11 RX ADMIN — Medication 2 MILLIGRAM(S): at 03:02

## 2017-02-11 RX ADMIN — Medication 130 MILLIGRAM(S): at 15:00

## 2017-02-11 RX ADMIN — Medication 130 MILLIGRAM(S): at 15:25

## 2017-02-11 RX ADMIN — Medication 2 MILLIGRAM(S): at 13:57

## 2017-02-11 RX ADMIN — Medication 130 MILLIGRAM(S): at 18:16

## 2017-02-11 RX ADMIN — SODIUM CHLORIDE 75 MILLILITER(S): 9 INJECTION INTRAMUSCULAR; INTRAVENOUS; SUBCUTANEOUS at 13:02

## 2017-02-11 RX ADMIN — Medication 2 MILLIGRAM(S): at 14:27

## 2017-02-11 RX ADMIN — MIDAZOLAM HYDROCHLORIDE 8 MILLIGRAM(S): 1 INJECTION, SOLUTION INTRAMUSCULAR; INTRAVENOUS at 20:05

## 2017-02-11 RX ADMIN — Medication 1 TABLET(S): at 12:21

## 2017-02-11 RX ADMIN — Medication 2 MILLIGRAM(S): at 10:27

## 2017-02-11 RX ADMIN — Medication 2 MILLIGRAM(S): at 12:20

## 2017-02-11 RX ADMIN — Medication 130 MILLIGRAM(S): at 18:03

## 2017-02-11 RX ADMIN — Medication 4 MILLIGRAM(S): at 13:01

## 2017-02-11 RX ADMIN — Medication 130 MILLIGRAM(S): at 17:38

## 2017-02-11 RX ADMIN — Medication 130 MILLIGRAM(S): at 18:38

## 2017-02-11 RX ADMIN — Medication 2 MILLIGRAM(S): at 06:37

## 2017-02-11 RX ADMIN — Medication 4 MILLIGRAM(S): at 11:19

## 2017-02-11 RX ADMIN — Medication 130 MILLIGRAM(S): at 19:30

## 2017-02-11 RX ADMIN — Medication 130 MILLIGRAM(S): at 15:44

## 2017-02-11 RX ADMIN — PROPOFOL 150 MILLIGRAM(S): 10 INJECTION, EMULSION INTRAVENOUS at 21:41

## 2017-02-11 RX ADMIN — Medication 4 MILLIGRAM(S): at 09:23

## 2017-02-11 RX ADMIN — Medication 130 MILLIGRAM(S): at 19:59

## 2017-02-11 RX ADMIN — Medication 1 APPLICATION(S): at 05:19

## 2017-02-12 LAB
ALBUMIN SERPL ELPH-MCNC: 3.7 G/DL — SIGNIFICANT CHANGE UP (ref 3.3–5)
ALP SERPL-CCNC: 99 U/L — SIGNIFICANT CHANGE UP (ref 40–120)
ALT FLD-CCNC: 42 U/L — HIGH (ref 4–41)
AMPHET UR-MCNC: NEGATIVE — SIGNIFICANT CHANGE UP
AST SERPL-CCNC: 39 U/L — SIGNIFICANT CHANGE UP (ref 4–40)
BARBITURATES UR SCN-MCNC: POSITIVE — SIGNIFICANT CHANGE UP
BENZODIAZ UR-MCNC: POSITIVE — SIGNIFICANT CHANGE UP
BILIRUB SERPL-MCNC: 0.6 MG/DL — SIGNIFICANT CHANGE UP (ref 0.2–1.2)
BUN SERPL-MCNC: 10 MG/DL — SIGNIFICANT CHANGE UP (ref 7–23)
CALCIUM SERPL-MCNC: 9.2 MG/DL — SIGNIFICANT CHANGE UP (ref 8.4–10.5)
CANNABINOIDS UR-MCNC: POSITIVE — SIGNIFICANT CHANGE UP
CHLORIDE SERPL-SCNC: 104 MMOL/L — SIGNIFICANT CHANGE UP (ref 98–107)
CO2 SERPL-SCNC: 20 MMOL/L — LOW (ref 22–31)
COCAINE METAB.OTHER UR-MCNC: NEGATIVE — SIGNIFICANT CHANGE UP
CREAT SERPL-MCNC: 0.82 MG/DL — SIGNIFICANT CHANGE UP (ref 0.5–1.3)
GLUCOSE SERPL-MCNC: 100 MG/DL — HIGH (ref 70–99)
HCT VFR BLD CALC: 41.7 % — SIGNIFICANT CHANGE UP (ref 39–50)
HGB BLD-MCNC: 14 G/DL — SIGNIFICANT CHANGE UP (ref 13–17)
MAGNESIUM SERPL-MCNC: 1.7 MG/DL — SIGNIFICANT CHANGE UP (ref 1.6–2.6)
MCHC RBC-ENTMCNC: 32.9 PG — SIGNIFICANT CHANGE UP (ref 27–34)
MCHC RBC-ENTMCNC: 33.6 % — SIGNIFICANT CHANGE UP (ref 32–36)
MCV RBC AUTO: 98.1 FL — SIGNIFICANT CHANGE UP (ref 80–100)
METHADONE UR-MCNC: NEGATIVE — SIGNIFICANT CHANGE UP
OPIATES UR-MCNC: NEGATIVE — SIGNIFICANT CHANGE UP
OXYCODONE UR-MCNC: NEGATIVE — SIGNIFICANT CHANGE UP
PCP UR-MCNC: NEGATIVE — SIGNIFICANT CHANGE UP
PHOSPHATE SERPL-MCNC: 5.1 MG/DL — HIGH (ref 2.5–4.5)
PLATELET # BLD AUTO: 109 K/UL — LOW (ref 150–400)
PMV BLD: 11.7 FL — SIGNIFICANT CHANGE UP (ref 7–13)
POTASSIUM SERPL-MCNC: 3.6 MMOL/L — SIGNIFICANT CHANGE UP (ref 3.5–5.3)
POTASSIUM SERPL-SCNC: 3.6 MMOL/L — SIGNIFICANT CHANGE UP (ref 3.5–5.3)
PROT SERPL-MCNC: 6.7 G/DL — SIGNIFICANT CHANGE UP (ref 6–8.3)
RBC # BLD: 4.25 M/UL — SIGNIFICANT CHANGE UP (ref 4.2–5.8)
RBC # FLD: 13.6 % — SIGNIFICANT CHANGE UP (ref 10.3–14.5)
SODIUM SERPL-SCNC: 142 MMOL/L — SIGNIFICANT CHANGE UP (ref 135–145)
WBC # BLD: 8.09 K/UL — SIGNIFICANT CHANGE UP (ref 3.8–10.5)
WBC # FLD AUTO: 8.09 K/UL — SIGNIFICANT CHANGE UP (ref 3.8–10.5)

## 2017-02-12 PROCEDURE — 99291 CRITICAL CARE FIRST HOUR: CPT

## 2017-02-12 RX ORDER — NOREPINEPHRINE BITARTRATE/D5W 8 MG/250ML
0.01 PLASTIC BAG, INJECTION (ML) INTRAVENOUS
Qty: 8 | Refills: 0 | Status: DISCONTINUED | OUTPATIENT
Start: 2017-02-12 | End: 2017-02-12

## 2017-02-12 RX ORDER — FENTANYL CITRATE 50 UG/ML
1 INJECTION INTRAVENOUS
Qty: 2500 | Refills: 0 | Status: DISCONTINUED | OUTPATIENT
Start: 2017-02-12 | End: 2017-02-13

## 2017-02-12 RX ORDER — FENTANYL CITRATE 50 UG/ML
100 INJECTION INTRAVENOUS ONCE
Qty: 0 | Refills: 0 | Status: DISCONTINUED | OUTPATIENT
Start: 2017-02-12 | End: 2017-02-12

## 2017-02-12 RX ADMIN — Medication 1 APPLICATION(S): at 12:07

## 2017-02-12 RX ADMIN — Medication 1.09 MICROGRAM(S)/KG/MIN: at 08:01

## 2017-02-12 RX ADMIN — MIDAZOLAM HYDROCHLORIDE 4 MG/HR: 1 INJECTION, SOLUTION INTRAMUSCULAR; INTRAVENOUS at 19:40

## 2017-02-12 RX ADMIN — FENTANYL CITRATE 1 MICROGRAM(S)/KG/HR: 50 INJECTION INTRAVENOUS at 20:02

## 2017-02-12 RX ADMIN — SODIUM CHLORIDE 125 MILLILITER(S): 9 INJECTION INTRAMUSCULAR; INTRAVENOUS; SUBCUTANEOUS at 19:41

## 2017-02-12 RX ADMIN — MIDAZOLAM HYDROCHLORIDE 4 MG/HR: 1 INJECTION, SOLUTION INTRAMUSCULAR; INTRAVENOUS at 06:24

## 2017-02-12 RX ADMIN — Medication 1 APPLICATION(S): at 17:42

## 2017-02-12 RX ADMIN — SODIUM CHLORIDE 125 MILLILITER(S): 9 INJECTION INTRAMUSCULAR; INTRAVENOUS; SUBCUTANEOUS at 16:12

## 2017-02-12 RX ADMIN — FENTANYL CITRATE 100 MICROGRAM(S): 50 INJECTION INTRAVENOUS at 20:02

## 2017-02-12 RX ADMIN — PROPOFOL 17.46 MICROGRAM(S)/KG/MIN: 10 INJECTION, EMULSION INTRAVENOUS at 19:40

## 2017-02-12 RX ADMIN — Medication 1.09 MICROGRAM(S)/KG/MIN: at 06:23

## 2017-02-12 RX ADMIN — PROPOFOL 17.46 MICROGRAM(S)/KG/MIN: 10 INJECTION, EMULSION INTRAVENOUS at 08:01

## 2017-02-12 RX ADMIN — Medication 105 MILLIGRAM(S): at 12:07

## 2017-02-12 RX ADMIN — PROPOFOL 17.46 MICROGRAM(S)/KG/MIN: 10 INJECTION, EMULSION INTRAVENOUS at 06:23

## 2017-02-12 RX ADMIN — FENTANYL CITRATE 5.82 MICROGRAM(S)/KG/HR: 50 INJECTION INTRAVENOUS at 20:02

## 2017-02-12 RX ADMIN — ENOXAPARIN SODIUM 40 MILLIGRAM(S): 100 INJECTION SUBCUTANEOUS at 06:23

## 2017-02-12 RX ADMIN — SODIUM CHLORIDE 75 MILLILITER(S): 9 INJECTION INTRAMUSCULAR; INTRAVENOUS; SUBCUTANEOUS at 06:24

## 2017-02-12 RX ADMIN — MIDAZOLAM HYDROCHLORIDE 4 MG/HR: 1 INJECTION, SOLUTION INTRAMUSCULAR; INTRAVENOUS at 08:02

## 2017-02-12 RX ADMIN — SODIUM CHLORIDE 75 MILLILITER(S): 9 INJECTION INTRAMUSCULAR; INTRAVENOUS; SUBCUTANEOUS at 08:13

## 2017-02-13 ENCOUNTER — APPOINTMENT (OUTPATIENT)
Dept: DERMATOLOGY | Facility: CLINIC | Age: 47
End: 2017-02-13

## 2017-02-13 LAB
ALBUMIN SERPL ELPH-MCNC: 3.1 G/DL — LOW (ref 3.3–5)
ALP SERPL-CCNC: 91 U/L — SIGNIFICANT CHANGE UP (ref 40–120)
ALT FLD-CCNC: 30 U/L — SIGNIFICANT CHANGE UP (ref 4–41)
AST SERPL-CCNC: 24 U/L — SIGNIFICANT CHANGE UP (ref 4–40)
BASOPHILS # BLD AUTO: 0.01 K/UL — SIGNIFICANT CHANGE UP (ref 0–0.2)
BASOPHILS NFR BLD AUTO: 0.2 % — SIGNIFICANT CHANGE UP (ref 0–2)
BILIRUB SERPL-MCNC: 0.5 MG/DL — SIGNIFICANT CHANGE UP (ref 0.2–1.2)
BUN SERPL-MCNC: 8 MG/DL — SIGNIFICANT CHANGE UP (ref 7–23)
CALCIUM SERPL-MCNC: 8.5 MG/DL — SIGNIFICANT CHANGE UP (ref 8.4–10.5)
CHLORIDE SERPL-SCNC: 107 MMOL/L — SIGNIFICANT CHANGE UP (ref 98–107)
CO2 SERPL-SCNC: 21 MMOL/L — LOW (ref 22–31)
CREAT SERPL-MCNC: 0.63 MG/DL — SIGNIFICANT CHANGE UP (ref 0.5–1.3)
EOSINOPHIL # BLD AUTO: 0.24 K/UL — SIGNIFICANT CHANGE UP (ref 0–0.5)
EOSINOPHIL NFR BLD AUTO: 5.5 % — SIGNIFICANT CHANGE UP (ref 0–6)
GLUCOSE SERPL-MCNC: 88 MG/DL — SIGNIFICANT CHANGE UP (ref 70–99)
HCT VFR BLD CALC: 49 % — SIGNIFICANT CHANGE UP (ref 39–50)
HGB BLD-MCNC: 16.2 G/DL — SIGNIFICANT CHANGE UP (ref 13–17)
IMM GRANULOCYTES NFR BLD AUTO: 0.5 % — SIGNIFICANT CHANGE UP (ref 0–1.5)
LYMPHOCYTES # BLD AUTO: 0.87 K/UL — LOW (ref 1–3.3)
LYMPHOCYTES # BLD AUTO: 20 % — SIGNIFICANT CHANGE UP (ref 13–44)
MAGNESIUM SERPL-MCNC: 1.7 MG/DL — SIGNIFICANT CHANGE UP (ref 1.6–2.6)
MCHC RBC-ENTMCNC: 32.7 PG — SIGNIFICANT CHANGE UP (ref 27–34)
MCHC RBC-ENTMCNC: 33.1 % — SIGNIFICANT CHANGE UP (ref 32–36)
MCV RBC AUTO: 98.8 FL — SIGNIFICANT CHANGE UP (ref 80–100)
MONOCYTES # BLD AUTO: 0.45 K/UL — SIGNIFICANT CHANGE UP (ref 0–0.9)
MONOCYTES NFR BLD AUTO: 10.4 % — SIGNIFICANT CHANGE UP (ref 2–14)
NEUTROPHILS # BLD AUTO: 2.75 K/UL — SIGNIFICANT CHANGE UP (ref 1.8–7.4)
NEUTROPHILS NFR BLD AUTO: 63.4 % — SIGNIFICANT CHANGE UP (ref 43–77)
PHOSPHATE SERPL-MCNC: 3.4 MG/DL — SIGNIFICANT CHANGE UP (ref 2.5–4.5)
PLATELET # BLD AUTO: 73 K/UL — LOW (ref 150–400)
PMV BLD: 11.4 FL — SIGNIFICANT CHANGE UP (ref 7–13)
POTASSIUM SERPL-MCNC: 3.5 MMOL/L — SIGNIFICANT CHANGE UP (ref 3.5–5.3)
POTASSIUM SERPL-SCNC: 3.5 MMOL/L — SIGNIFICANT CHANGE UP (ref 3.5–5.3)
PREALB SERPL-MCNC: 23 MG/DL — SIGNIFICANT CHANGE UP (ref 20–40)
PROT SERPL-MCNC: 5.9 G/DL — LOW (ref 6–8.3)
RBC # BLD: 4.96 M/UL — SIGNIFICANT CHANGE UP (ref 4.2–5.8)
RBC # FLD: 13.9 % — SIGNIFICANT CHANGE UP (ref 10.3–14.5)
SODIUM SERPL-SCNC: 144 MMOL/L — SIGNIFICANT CHANGE UP (ref 135–145)
WBC # BLD: 4.34 K/UL — SIGNIFICANT CHANGE UP (ref 3.8–10.5)
WBC # FLD AUTO: 4.34 K/UL — SIGNIFICANT CHANGE UP (ref 3.8–10.5)

## 2017-02-13 PROCEDURE — 99291 CRITICAL CARE FIRST HOUR: CPT | Mod: 25

## 2017-02-13 RX ORDER — MIDAZOLAM HYDROCHLORIDE 1 MG/ML
6 INJECTION, SOLUTION INTRAMUSCULAR; INTRAVENOUS
Qty: 100 | Refills: 0 | Status: DISCONTINUED | OUTPATIENT
Start: 2017-02-13 | End: 2017-02-13

## 2017-02-13 RX ORDER — SODIUM CHLORIDE 9 MG/ML
1000 INJECTION, SOLUTION INTRAVENOUS
Qty: 0 | Refills: 0 | Status: DISCONTINUED | OUTPATIENT
Start: 2017-02-13 | End: 2017-02-13

## 2017-02-13 RX ORDER — PHENOBARBITAL 60 MG
1000 TABLET ORAL ONCE
Qty: 0 | Refills: 0 | Status: DISCONTINUED | OUTPATIENT
Start: 2017-02-13 | End: 2017-02-13

## 2017-02-13 RX ORDER — PHENOBARBITAL 60 MG
130 TABLET ORAL
Qty: 0 | Refills: 0 | Status: DISCONTINUED | OUTPATIENT
Start: 2017-02-13 | End: 2017-02-14

## 2017-02-13 RX ORDER — PHENOBARBITAL 60 MG
130 TABLET ORAL
Qty: 0 | Refills: 0 | Status: DISCONTINUED | OUTPATIENT
Start: 2017-02-13 | End: 2017-02-13

## 2017-02-13 RX ORDER — FOLIC ACID 0.8 MG
1 TABLET ORAL DAILY
Qty: 0 | Refills: 0 | Status: DISCONTINUED | OUTPATIENT
Start: 2017-02-13 | End: 2017-02-16

## 2017-02-13 RX ORDER — THIAMINE MONONITRATE (VIT B1) 100 MG
500 TABLET ORAL THREE TIMES A DAY
Qty: 0 | Refills: 0 | Status: COMPLETED | OUTPATIENT
Start: 2017-02-13 | End: 2017-02-16

## 2017-02-13 RX ORDER — FENTANYL CITRATE 50 UG/ML
1 INJECTION INTRAVENOUS
Qty: 2500 | Refills: 0 | Status: DISCONTINUED | OUTPATIENT
Start: 2017-02-13 | End: 2017-02-13

## 2017-02-13 RX ADMIN — MIDAZOLAM HYDROCHLORIDE 6 MG/HR: 1 INJECTION, SOLUTION INTRAMUSCULAR; INTRAVENOUS at 11:19

## 2017-02-13 RX ADMIN — Medication 1 APPLICATION(S): at 17:32

## 2017-02-13 RX ADMIN — Medication 107.69 MILLIGRAM(S): at 13:37

## 2017-02-13 RX ADMIN — Medication 1 APPLICATION(S): at 06:15

## 2017-02-13 RX ADMIN — Medication 130 MILLIGRAM(S): at 18:15

## 2017-02-13 RX ADMIN — Medication 1 APPLICATION(S): at 00:15

## 2017-02-13 RX ADMIN — Medication 130 MILLIGRAM(S): at 17:20

## 2017-02-13 RX ADMIN — PROPOFOL 17.46 MICROGRAM(S)/KG/MIN: 10 INJECTION, EMULSION INTRAVENOUS at 07:45

## 2017-02-13 RX ADMIN — Medication 105 MILLIGRAM(S): at 11:18

## 2017-02-13 RX ADMIN — ENOXAPARIN SODIUM 40 MILLIGRAM(S): 100 INJECTION SUBCUTANEOUS at 05:54

## 2017-02-13 RX ADMIN — Medication 130 MILLIGRAM(S): at 22:23

## 2017-02-13 RX ADMIN — SODIUM CHLORIDE 75 MILLILITER(S): 9 INJECTION, SOLUTION INTRAVENOUS at 07:45

## 2017-02-13 RX ADMIN — Medication 105 MILLIGRAM(S): at 22:27

## 2017-02-13 RX ADMIN — Medication 130 MILLIGRAM(S): at 18:00

## 2017-02-13 RX ADMIN — PROPOFOL 17.46 MICROGRAM(S)/KG/MIN: 10 INJECTION, EMULSION INTRAVENOUS at 13:24

## 2017-02-13 RX ADMIN — Medication 130 MILLIGRAM(S): at 20:44

## 2017-02-13 RX ADMIN — MIDAZOLAM HYDROCHLORIDE 6 MG/HR: 1 INJECTION, SOLUTION INTRAMUSCULAR; INTRAVENOUS at 07:44

## 2017-02-13 RX ADMIN — Medication 130 MILLIGRAM(S): at 23:43

## 2017-02-13 RX ADMIN — Medication 130 MILLIGRAM(S): at 20:05

## 2017-02-13 RX ADMIN — Medication 1 APPLICATION(S): at 11:19

## 2017-02-13 RX ADMIN — FENTANYL CITRATE 5.82 MICROGRAM(S)/KG/HR: 50 INJECTION INTRAVENOUS at 07:45

## 2017-02-13 RX ADMIN — FENTANYL CITRATE 1 MICROGRAM(S)/KG/HR: 50 INJECTION INTRAVENOUS at 07:46

## 2017-02-13 RX ADMIN — Medication 130 MILLIGRAM(S): at 17:50

## 2017-02-13 RX ADMIN — Medication 130 MILLIGRAM(S): at 17:35

## 2017-02-13 RX ADMIN — Medication 130 MILLIGRAM(S): at 16:57

## 2017-02-14 LAB
BUN SERPL-MCNC: 6 MG/DL — LOW (ref 7–23)
CALCIUM SERPL-MCNC: 9 MG/DL — SIGNIFICANT CHANGE UP (ref 8.4–10.5)
CHLORIDE SERPL-SCNC: 104 MMOL/L — SIGNIFICANT CHANGE UP (ref 98–107)
CO2 SERPL-SCNC: 24 MMOL/L — SIGNIFICANT CHANGE UP (ref 22–31)
CREAT SERPL-MCNC: 0.78 MG/DL — SIGNIFICANT CHANGE UP (ref 0.5–1.3)
GLUCOSE SERPL-MCNC: 77 MG/DL — SIGNIFICANT CHANGE UP (ref 70–99)
HCT VFR BLD CALC: 39.2 % — SIGNIFICANT CHANGE UP (ref 39–50)
HGB BLD-MCNC: 12.9 G/DL — LOW (ref 13–17)
MAGNESIUM SERPL-MCNC: 1.4 MG/DL — LOW (ref 1.6–2.6)
MCHC RBC-ENTMCNC: 32.3 PG — SIGNIFICANT CHANGE UP (ref 27–34)
MCHC RBC-ENTMCNC: 32.9 % — SIGNIFICANT CHANGE UP (ref 32–36)
MCV RBC AUTO: 98 FL — SIGNIFICANT CHANGE UP (ref 80–100)
PHOSPHATE SERPL-MCNC: 2.3 MG/DL — LOW (ref 2.5–4.5)
PLATELET # BLD AUTO: 144 K/UL — LOW (ref 150–400)
PMV BLD: 11.8 FL — SIGNIFICANT CHANGE UP (ref 7–13)
POTASSIUM SERPL-MCNC: 3.3 MMOL/L — LOW (ref 3.5–5.3)
POTASSIUM SERPL-SCNC: 3.3 MMOL/L — LOW (ref 3.5–5.3)
RBC # BLD: 4 M/UL — LOW (ref 4.2–5.8)
RBC # FLD: 13.3 % — SIGNIFICANT CHANGE UP (ref 10.3–14.5)
SODIUM SERPL-SCNC: 142 MMOL/L — SIGNIFICANT CHANGE UP (ref 135–145)
WBC # BLD: 8.83 K/UL — SIGNIFICANT CHANGE UP (ref 3.8–10.5)
WBC # FLD AUTO: 8.83 K/UL — SIGNIFICANT CHANGE UP (ref 3.8–10.5)

## 2017-02-14 PROCEDURE — 99291 CRITICAL CARE FIRST HOUR: CPT | Mod: 25

## 2017-02-14 RX ORDER — SODIUM CHLORIDE 9 MG/ML
1000 INJECTION INTRAMUSCULAR; INTRAVENOUS; SUBCUTANEOUS
Qty: 0 | Refills: 0 | Status: DISCONTINUED | OUTPATIENT
Start: 2017-02-14 | End: 2017-02-15

## 2017-02-14 RX ORDER — MAGNESIUM SULFATE 500 MG/ML
1 VIAL (ML) INJECTION ONCE
Qty: 0 | Refills: 0 | Status: COMPLETED | OUTPATIENT
Start: 2017-02-14 | End: 2017-02-14

## 2017-02-14 RX ORDER — POTASSIUM CHLORIDE 20 MEQ
10 PACKET (EA) ORAL ONCE
Qty: 0 | Refills: 0 | Status: COMPLETED | OUTPATIENT
Start: 2017-02-14 | End: 2017-02-14

## 2017-02-14 RX ORDER — PHENOBARBITAL 60 MG
129.6 TABLET ORAL
Qty: 0 | Refills: 0 | Status: DISCONTINUED | OUTPATIENT
Start: 2017-02-14 | End: 2017-02-15

## 2017-02-14 RX ORDER — POTASSIUM PHOSPHATE, MONOBASIC POTASSIUM PHOSPHATE, DIBASIC 236; 224 MG/ML; MG/ML
15 INJECTION, SOLUTION INTRAVENOUS ONCE
Qty: 0 | Refills: 0 | Status: COMPLETED | OUTPATIENT
Start: 2017-02-14 | End: 2017-02-14

## 2017-02-14 RX ADMIN — Medication 130 MILLIGRAM(S): at 00:32

## 2017-02-14 RX ADMIN — Medication 105 MILLIGRAM(S): at 06:03

## 2017-02-14 RX ADMIN — Medication 130 MILLIGRAM(S): at 03:56

## 2017-02-14 RX ADMIN — SODIUM CHLORIDE 75 MILLILITER(S): 9 INJECTION INTRAMUSCULAR; INTRAVENOUS; SUBCUTANEOUS at 11:29

## 2017-02-14 RX ADMIN — ENOXAPARIN SODIUM 40 MILLIGRAM(S): 100 INJECTION SUBCUTANEOUS at 05:08

## 2017-02-14 RX ADMIN — Medication 1 APPLICATION(S): at 11:27

## 2017-02-14 RX ADMIN — Medication 1 APPLICATION(S): at 00:54

## 2017-02-14 RX ADMIN — Medication 130 MILLIGRAM(S): at 12:17

## 2017-02-14 RX ADMIN — Medication 1 MILLIGRAM(S): at 11:28

## 2017-02-14 RX ADMIN — Medication 1 APPLICATION(S): at 05:08

## 2017-02-14 RX ADMIN — Medication 105 MILLIGRAM(S): at 23:30

## 2017-02-14 RX ADMIN — POTASSIUM PHOSPHATE, MONOBASIC POTASSIUM PHOSPHATE, DIBASIC 62.5 MILLIMOLE(S): 236; 224 INJECTION, SOLUTION INTRAVENOUS at 05:06

## 2017-02-14 RX ADMIN — Medication 100 MILLIEQUIVALENT(S): at 05:06

## 2017-02-14 RX ADMIN — Medication 100 GRAM(S): at 05:06

## 2017-02-14 RX ADMIN — Medication 130 MILLIGRAM(S): at 02:10

## 2017-02-14 RX ADMIN — Medication 1 APPLICATION(S): at 18:00

## 2017-02-14 RX ADMIN — Medication 105 MILLIGRAM(S): at 15:24

## 2017-02-14 NOTE — DIETITIAN INITIAL EVALUATION ADULT. - OTHER INFO
Pt. unable to provide nutritional Hx, given cognitive status.  Pt. s/p extubation and tube feeding D/C'd.  No reported/noted nausea/vomiting/diarrhea/constipation.  NKFA.

## 2017-02-14 NOTE — DIETITIAN INITIAL EVALUATION ADULT. - PROBLEM SELECTOR PLAN 1
- Pt received valium in ED and ciwa is still 14. Will give dose librium and start ativan taper and monitor ciwa. ativan prn ciwa>8  - Continue banana bag

## 2017-02-14 NOTE — DIETITIAN INITIAL EVALUATION ADULT. - PROBLEM SELECTOR PLAN 3
- Etiology unclear  - Seen by derm on prior visit. Pt states rash has gotten worse and no improvement with topical treamtent recommended by derm   - Would reconsult derm in am for further recommendation  - Would avoid PO or IV benadryl while pt receiving ativan to avoid oversedation. Will monitor clinically and try topical management if needed

## 2017-02-14 NOTE — DIETITIAN INITIAL EVALUATION ADULT. - NS AS NUTRI INTERV COLLABORAT
1) Clarify diet order (suggest NPO at this time).   If/when cognitive status improves, advance to PO diet.  2) Would consider swallow evaluation to help determine most appropriate food/fluid consistency  3) Obtain daily weights

## 2017-02-15 LAB
BUN SERPL-MCNC: 6 MG/DL — LOW (ref 7–23)
CALCIUM SERPL-MCNC: 9.1 MG/DL — SIGNIFICANT CHANGE UP (ref 8.4–10.5)
CHLORIDE SERPL-SCNC: 103 MMOL/L — SIGNIFICANT CHANGE UP (ref 98–107)
CO2 SERPL-SCNC: 21 MMOL/L — LOW (ref 22–31)
CREAT SERPL-MCNC: 0.76 MG/DL — SIGNIFICANT CHANGE UP (ref 0.5–1.3)
GLUCOSE SERPL-MCNC: 92 MG/DL — SIGNIFICANT CHANGE UP (ref 70–99)
HCT VFR BLD CALC: 39.6 % — SIGNIFICANT CHANGE UP (ref 39–50)
HGB BLD-MCNC: 13.4 G/DL — SIGNIFICANT CHANGE UP (ref 13–17)
MAGNESIUM SERPL-MCNC: 1.7 MG/DL — SIGNIFICANT CHANGE UP (ref 1.6–2.6)
MCHC RBC-ENTMCNC: 33.3 PG — SIGNIFICANT CHANGE UP (ref 27–34)
MCHC RBC-ENTMCNC: 33.8 % — SIGNIFICANT CHANGE UP (ref 32–36)
MCV RBC AUTO: 98.3 FL — SIGNIFICANT CHANGE UP (ref 80–100)
PHOSPHATE SERPL-MCNC: 2.7 MG/DL — SIGNIFICANT CHANGE UP (ref 2.5–4.5)
PLATELET # BLD AUTO: 192 K/UL — SIGNIFICANT CHANGE UP (ref 150–400)
PMV BLD: 11.9 FL — SIGNIFICANT CHANGE UP (ref 7–13)
POTASSIUM SERPL-MCNC: 3.3 MMOL/L — LOW (ref 3.5–5.3)
POTASSIUM SERPL-SCNC: 3.3 MMOL/L — LOW (ref 3.5–5.3)
RBC # BLD: 4.03 M/UL — LOW (ref 4.2–5.8)
RBC # FLD: 13.3 % — SIGNIFICANT CHANGE UP (ref 10.3–14.5)
SODIUM SERPL-SCNC: 145 MMOL/L — SIGNIFICANT CHANGE UP (ref 135–145)
WBC # BLD: 9.29 K/UL — SIGNIFICANT CHANGE UP (ref 3.8–10.5)
WBC # FLD AUTO: 9.29 K/UL — SIGNIFICANT CHANGE UP (ref 3.8–10.5)

## 2017-02-15 PROCEDURE — 99232 SBSQ HOSP IP/OBS MODERATE 35: CPT

## 2017-02-15 PROCEDURE — 99233 SBSQ HOSP IP/OBS HIGH 50: CPT | Mod: GC

## 2017-02-15 RX ORDER — POTASSIUM CHLORIDE 20 MEQ
10 PACKET (EA) ORAL ONCE
Qty: 0 | Refills: 0 | Status: DISCONTINUED | OUTPATIENT
Start: 2017-02-15 | End: 2017-02-15

## 2017-02-15 RX ORDER — SODIUM CHLORIDE 9 MG/ML
1000 INJECTION INTRAMUSCULAR; INTRAVENOUS; SUBCUTANEOUS
Qty: 0 | Refills: 0 | Status: DISCONTINUED | OUTPATIENT
Start: 2017-02-15 | End: 2017-02-17

## 2017-02-15 RX ORDER — PHENOBARBITAL 60 MG
260 TABLET ORAL ONCE
Qty: 0 | Refills: 0 | Status: DISCONTINUED | OUTPATIENT
Start: 2017-02-15 | End: 2017-02-15

## 2017-02-15 RX ORDER — PHENOBARBITAL 60 MG
130 TABLET ORAL
Qty: 0 | Refills: 0 | Status: DISCONTINUED | OUTPATIENT
Start: 2017-02-15 | End: 2017-02-17

## 2017-02-15 RX ORDER — POTASSIUM CHLORIDE 20 MEQ
20 PACKET (EA) ORAL ONCE
Qty: 0 | Refills: 0 | Status: DISCONTINUED | OUTPATIENT
Start: 2017-02-15 | End: 2017-02-15

## 2017-02-15 RX ORDER — POTASSIUM CHLORIDE 20 MEQ
10 PACKET (EA) ORAL ONCE
Qty: 0 | Refills: 0 | Status: COMPLETED | OUTPATIENT
Start: 2017-02-15 | End: 2017-02-15

## 2017-02-15 RX ADMIN — Medication 1 APPLICATION(S): at 00:30

## 2017-02-15 RX ADMIN — Medication 1 APPLICATION(S): at 17:10

## 2017-02-15 RX ADMIN — Medication 130 MILLIGRAM(S): at 16:03

## 2017-02-15 RX ADMIN — Medication 130 MILLIGRAM(S): at 13:55

## 2017-02-15 RX ADMIN — Medication 105 MILLIGRAM(S): at 06:00

## 2017-02-15 RX ADMIN — Medication 1 APPLICATION(S): at 11:45

## 2017-02-15 RX ADMIN — Medication 1 APPLICATION(S): at 06:01

## 2017-02-15 RX ADMIN — Medication 260 MILLIGRAM(S): at 17:57

## 2017-02-15 RX ADMIN — SODIUM CHLORIDE 75 MILLILITER(S): 9 INJECTION INTRAMUSCULAR; INTRAVENOUS; SUBCUTANEOUS at 16:03

## 2017-02-15 RX ADMIN — ENOXAPARIN SODIUM 40 MILLIGRAM(S): 100 INJECTION SUBCUTANEOUS at 06:00

## 2017-02-15 RX ADMIN — Medication 130 MILLIGRAM(S): at 17:20

## 2017-02-15 RX ADMIN — Medication 105 MILLIGRAM(S): at 21:30

## 2017-02-15 RX ADMIN — Medication 100 MILLIEQUIVALENT(S): at 12:30

## 2017-02-15 RX ADMIN — Medication 130 MILLIGRAM(S): at 12:44

## 2017-02-15 RX ADMIN — Medication 105 MILLIGRAM(S): at 12:30

## 2017-02-15 RX ADMIN — Medication 130 MILLIGRAM(S): at 11:44

## 2017-02-15 RX ADMIN — Medication 1 MILLIGRAM(S): at 11:45

## 2017-02-15 NOTE — PROVIDER CONTACT NOTE (OTHER) - ACTION/TREATMENT ORDERED:
patient received a lot of phenobarbitol today, is expected he will be a little lethargic.  Continue to monitor and assess CIWA score and pulse ox q1h

## 2017-02-16 LAB
BUN SERPL-MCNC: 5 MG/DL — LOW (ref 7–23)
CALCIUM SERPL-MCNC: 9.3 MG/DL — SIGNIFICANT CHANGE UP (ref 8.4–10.5)
CHLORIDE SERPL-SCNC: 104 MMOL/L — SIGNIFICANT CHANGE UP (ref 98–107)
CO2 SERPL-SCNC: 20 MMOL/L — LOW (ref 22–31)
CREAT SERPL-MCNC: 0.75 MG/DL — SIGNIFICANT CHANGE UP (ref 0.5–1.3)
GLUCOSE SERPL-MCNC: 66 MG/DL — LOW (ref 70–99)
HCT VFR BLD CALC: 40.1 % — SIGNIFICANT CHANGE UP (ref 39–50)
HGB BLD-MCNC: 13.4 G/DL — SIGNIFICANT CHANGE UP (ref 13–17)
MAGNESIUM SERPL-MCNC: 1.8 MG/DL — SIGNIFICANT CHANGE UP (ref 1.6–2.6)
MCHC RBC-ENTMCNC: 32.9 PG — SIGNIFICANT CHANGE UP (ref 27–34)
MCHC RBC-ENTMCNC: 33.4 % — SIGNIFICANT CHANGE UP (ref 32–36)
MCV RBC AUTO: 98.5 FL — SIGNIFICANT CHANGE UP (ref 80–100)
PHOSPHATE SERPL-MCNC: 2.7 MG/DL — SIGNIFICANT CHANGE UP (ref 2.5–4.5)
PLATELET # BLD AUTO: 267 K/UL — SIGNIFICANT CHANGE UP (ref 150–400)
PMV BLD: 10.9 FL — SIGNIFICANT CHANGE UP (ref 7–13)
POTASSIUM SERPL-MCNC: 3.6 MMOL/L — SIGNIFICANT CHANGE UP (ref 3.5–5.3)
POTASSIUM SERPL-SCNC: 3.6 MMOL/L — SIGNIFICANT CHANGE UP (ref 3.5–5.3)
RBC # BLD: 4.07 M/UL — LOW (ref 4.2–5.8)
RBC # FLD: 13.1 % — SIGNIFICANT CHANGE UP (ref 10.3–14.5)
SODIUM SERPL-SCNC: 146 MMOL/L — HIGH (ref 135–145)
WBC # BLD: 7.98 K/UL — SIGNIFICANT CHANGE UP (ref 3.8–10.5)
WBC # FLD AUTO: 7.98 K/UL — SIGNIFICANT CHANGE UP (ref 3.8–10.5)

## 2017-02-16 PROCEDURE — 71010: CPT | Mod: 26

## 2017-02-16 PROCEDURE — 99232 SBSQ HOSP IP/OBS MODERATE 35: CPT | Mod: GC

## 2017-02-16 PROCEDURE — 99232 SBSQ HOSP IP/OBS MODERATE 35: CPT

## 2017-02-16 RX ORDER — HALOPERIDOL DECANOATE 100 MG/ML
3 INJECTION INTRAMUSCULAR ONCE
Qty: 0 | Refills: 0 | Status: COMPLETED | OUTPATIENT
Start: 2017-02-16 | End: 2017-02-16

## 2017-02-16 RX ORDER — THIAMINE MONONITRATE (VIT B1) 100 MG
100 TABLET ORAL DAILY
Qty: 0 | Refills: 0 | Status: DISCONTINUED | OUTPATIENT
Start: 2017-02-16 | End: 2017-02-16

## 2017-02-16 RX ORDER — SODIUM CHLORIDE 9 MG/ML
1000 INJECTION, SOLUTION INTRAVENOUS
Qty: 0 | Refills: 0 | Status: DISCONTINUED | OUTPATIENT
Start: 2017-02-16 | End: 2017-02-17

## 2017-02-16 RX ADMIN — Medication 1 APPLICATION(S): at 06:33

## 2017-02-16 RX ADMIN — Medication 130 MILLIGRAM(S): at 21:52

## 2017-02-16 RX ADMIN — Medication 130 MILLIGRAM(S): at 20:16

## 2017-02-16 RX ADMIN — ENOXAPARIN SODIUM 40 MILLIGRAM(S): 100 INJECTION SUBCUTANEOUS at 06:32

## 2017-02-16 RX ADMIN — HALOPERIDOL DECANOATE 3 MILLIGRAM(S): 100 INJECTION INTRAMUSCULAR at 23:44

## 2017-02-16 RX ADMIN — SODIUM CHLORIDE 75 MILLILITER(S): 9 INJECTION, SOLUTION INTRAVENOUS at 09:51

## 2017-02-16 RX ADMIN — Medication 1 APPLICATION(S): at 12:00

## 2017-02-16 RX ADMIN — Medication 130 MILLIGRAM(S): at 18:51

## 2017-02-16 RX ADMIN — Medication 1 APPLICATION(S): at 00:16

## 2017-02-16 RX ADMIN — Medication 1 APPLICATION(S): at 17:10

## 2017-02-16 RX ADMIN — Medication 130 MILLIGRAM(S): at 22:46

## 2017-02-16 RX ADMIN — Medication 105 MILLIGRAM(S): at 06:32

## 2017-02-16 NOTE — SWALLOW BEDSIDE ASSESSMENT ADULT - ASR SWALLOW ASPIRATION MONITOR
upper respiratory infection/position upright (90Y)/cough/gurgly voice/oral hygiene/pneumonia/fever/throat clearing/change of breathing pattern

## 2017-02-16 NOTE — SWALLOW BEDSIDE ASSESSMENT ADULT - COMMENTS
The patient was seen this afternoon for initial assessment of swallow function, at which time the patient was lethargic though arouseable. He required maximum verbal/visual/tactile prompting throughout this evaluation to remain awake, as he exhibited decreased levels of alertness in the absence of cueing. Per RN report, the patient has been continuously attempting to get out of his bed. Per charting, the patient is a 47 y/o M with h/o EtOH use and chronic rash. He presented to [facility] with a worsening rash and EtOH withdrawal. The patient was recently admitted with similar complaints and discharged with topical treatments but says he has not noticed any improvement. Recent chest xray performed on 2/11/17 revealed, " The lungs are clear, the heart is not enlarged and there are no effusions or pneumothorax." On 2/11, the patient was sedated and intubated for protection d/t increased agitation. He was then extubated on 2/13. Discussed results and recommendations from this evaluation with the RN and Team 1.

## 2017-02-16 NOTE — SWALLOW BEDSIDE ASSESSMENT ADULT - SWALLOW EVAL: DIAGNOSIS
1. The patient demonstrated a severe oral dysphagia for puree and thin liquid via tsp presentations marked by reduced stripping of the bolus from the utensil, absent oral manipulation, and absent AP transit of the bolus in the oral cavity. Anterior and lateral spillage of the presented bolus noted d/t reduced labial seal. 2. The pharyngeal phase of the swallow could not adequately assessed due to absent pharyngeal swallow trigger and absent hyolaryngeal elevation. 3. The patient is at a high risk for aspiration and insufficient nutrition/hydration due to decreased levels of alertness at this time. 1. The patient demonstrated a severe oral dysphagia for puree and thin liquid via tsp presentations marked by reduced stripping of the bolus from the utensil, absent oral manipulation, and absent AP transit of the bolus in the oral cavity. Anterior and lateral spillage of the presented bolus noted d/t reduced labial seal. 2. The pharyngeal phase of the swallow could not be adequately assessed due to an absent pharyngeal swallow trigger and absent hyolaryngeal elevation. Subsequently, the bolus was manually removed from the patient's oral cavity by this clinician to reduce risk of aspiration. 3. The patient is at a high risk for aspiration and insufficient nutrition/hydration due to the severity of the oral-pharyngeal phase deficits and decreased levels of alertness at this time.

## 2017-02-16 NOTE — PROVIDER CONTACT NOTE (OTHER) - ACTION/TREATMENT ORDERED:
blood cultures and UA ordered, Tylenol to be given blood cultures and UA ordered, holding off on Tylenol until cultures obtained

## 2017-02-16 NOTE — SWALLOW BEDSIDE ASSESSMENT ADULT - SWALLOW EVAL: RECOMMENDED DIET
Oral nutrition/hydration is contraindicated at this time. Consider short-term non-oral means of nutrition/hydration.

## 2017-02-16 NOTE — SWALLOW BEDSIDE ASSESSMENT ADULT - MUCOSAL QUALITY
Intra-oral examination revealed dried secretions on labial and lingual surfaces. This clinician provided extensive oral care via cold water swabbing to remove dried secretions and to hydrate tissue.

## 2017-02-17 LAB
ALBUMIN SERPL ELPH-MCNC: 3.4 G/DL — SIGNIFICANT CHANGE UP (ref 3.3–5)
ALP SERPL-CCNC: 97 U/L — SIGNIFICANT CHANGE UP (ref 40–120)
ALT FLD-CCNC: 18 U/L — SIGNIFICANT CHANGE UP (ref 4–41)
APPEARANCE UR: SIGNIFICANT CHANGE UP
AST SERPL-CCNC: 29 U/L — SIGNIFICANT CHANGE UP (ref 4–40)
BILIRUB DIRECT SERPL-MCNC: 0.1 MG/DL — SIGNIFICANT CHANGE UP (ref 0.1–0.2)
BILIRUB SERPL-MCNC: 0.3 MG/DL — SIGNIFICANT CHANGE UP (ref 0.2–1.2)
BILIRUB UR-MCNC: NEGATIVE — SIGNIFICANT CHANGE UP
BLOOD UR QL VISUAL: HIGH
BUN SERPL-MCNC: 3 MG/DL — LOW (ref 7–23)
CALCIUM SERPL-MCNC: 9.1 MG/DL — SIGNIFICANT CHANGE UP (ref 8.4–10.5)
CHLORIDE SERPL-SCNC: 104 MMOL/L — SIGNIFICANT CHANGE UP (ref 98–107)
CO2 SERPL-SCNC: 20 MMOL/L — LOW (ref 22–31)
COLOR SPEC: YELLOW — SIGNIFICANT CHANGE UP
CREAT SERPL-MCNC: 0.61 MG/DL — SIGNIFICANT CHANGE UP (ref 0.5–1.3)
GLUCOSE SERPL-MCNC: 97 MG/DL — SIGNIFICANT CHANGE UP (ref 70–99)
GLUCOSE UR-MCNC: NEGATIVE — SIGNIFICANT CHANGE UP
HCT VFR BLD CALC: 37.7 % — LOW (ref 39–50)
HGB BLD-MCNC: 12.7 G/DL — LOW (ref 13–17)
HYALINE CASTS # UR AUTO: SIGNIFICANT CHANGE UP (ref 0–?)
KETONES UR-MCNC: SIGNIFICANT CHANGE UP
LEUKOCYTE ESTERASE UR-ACNC: NEGATIVE — SIGNIFICANT CHANGE UP
MAGNESIUM SERPL-MCNC: 1.5 MG/DL — LOW (ref 1.6–2.6)
MCHC RBC-ENTMCNC: 32.3 PG — SIGNIFICANT CHANGE UP (ref 27–34)
MCHC RBC-ENTMCNC: 33.7 % — SIGNIFICANT CHANGE UP (ref 32–36)
MCV RBC AUTO: 95.9 FL — SIGNIFICANT CHANGE UP (ref 80–100)
MUCOUS THREADS # UR AUTO: SIGNIFICANT CHANGE UP
NITRITE UR-MCNC: NEGATIVE — SIGNIFICANT CHANGE UP
PH UR: 6 — SIGNIFICANT CHANGE UP (ref 4.6–8)
PHOSPHATE SERPL-MCNC: 2.1 MG/DL — LOW (ref 2.5–4.5)
PLATELET # BLD AUTO: 316 K/UL — SIGNIFICANT CHANGE UP (ref 150–400)
PMV BLD: 10.4 FL — SIGNIFICANT CHANGE UP (ref 7–13)
POTASSIUM SERPL-MCNC: 2.9 MMOL/L — CRITICAL LOW (ref 3.5–5.3)
POTASSIUM SERPL-SCNC: 2.9 MMOL/L — CRITICAL LOW (ref 3.5–5.3)
PROT SERPL-MCNC: 6.8 G/DL — SIGNIFICANT CHANGE UP (ref 6–8.3)
PROT UR-MCNC: 30 — SIGNIFICANT CHANGE UP
RBC # BLD: 3.93 M/UL — LOW (ref 4.2–5.8)
RBC # FLD: 12.7 % — SIGNIFICANT CHANGE UP (ref 10.3–14.5)
RBC CASTS # UR COMP ASSIST: >50 — HIGH (ref 0–?)
SODIUM SERPL-SCNC: 145 MMOL/L — SIGNIFICANT CHANGE UP (ref 135–145)
SP GR SPEC: 1.02 — SIGNIFICANT CHANGE UP (ref 1–1.03)
SQUAMOUS # UR AUTO: SIGNIFICANT CHANGE UP
UROBILINOGEN FLD QL: NORMAL E.U. — SIGNIFICANT CHANGE UP (ref 0.1–0.2)
WBC # BLD: 8.96 K/UL — SIGNIFICANT CHANGE UP (ref 3.8–10.5)
WBC # FLD AUTO: 8.96 K/UL — SIGNIFICANT CHANGE UP (ref 3.8–10.5)
WBC UR QL: SIGNIFICANT CHANGE UP (ref 0–?)

## 2017-02-17 PROCEDURE — 99233 SBSQ HOSP IP/OBS HIGH 50: CPT | Mod: GC

## 2017-02-17 PROCEDURE — 99232 SBSQ HOSP IP/OBS MODERATE 35: CPT

## 2017-02-17 PROCEDURE — 71010: CPT | Mod: 26,76

## 2017-02-17 PROCEDURE — 93010 ELECTROCARDIOGRAM REPORT: CPT

## 2017-02-17 RX ORDER — HALOPERIDOL DECANOATE 100 MG/ML
3 INJECTION INTRAMUSCULAR EVERY 6 HOURS
Qty: 0 | Refills: 0 | Status: DISCONTINUED | OUTPATIENT
Start: 2017-02-17 | End: 2017-02-17

## 2017-02-17 RX ORDER — SODIUM CHLORIDE 9 MG/ML
1000 INJECTION, SOLUTION INTRAVENOUS
Qty: 0 | Refills: 0 | Status: DISCONTINUED | OUTPATIENT
Start: 2017-02-17 | End: 2017-02-19

## 2017-02-17 RX ORDER — MULTIVIT-MIN/FERROUS GLUCONATE 9 MG/15 ML
1 LIQUID (ML) ORAL DAILY
Qty: 0 | Refills: 0 | Status: DISCONTINUED | OUTPATIENT
Start: 2017-02-17 | End: 2017-02-17

## 2017-02-17 RX ORDER — FOLIC ACID 0.8 MG
1 TABLET ORAL DAILY
Qty: 0 | Refills: 0 | Status: DISCONTINUED | OUTPATIENT
Start: 2017-02-17 | End: 2017-02-17

## 2017-02-17 RX ORDER — OLANZAPINE 15 MG/1
5 TABLET, FILM COATED ORAL
Qty: 0 | Refills: 0 | Status: DISCONTINUED | OUTPATIENT
Start: 2017-02-17 | End: 2017-02-21

## 2017-02-17 RX ORDER — MAGNESIUM SULFATE 500 MG/ML
2 VIAL (ML) INJECTION ONCE
Qty: 0 | Refills: 0 | Status: COMPLETED | OUTPATIENT
Start: 2017-02-17 | End: 2017-02-17

## 2017-02-17 RX ORDER — POTASSIUM CHLORIDE 20 MEQ
10 PACKET (EA) ORAL
Qty: 0 | Refills: 0 | Status: COMPLETED | OUTPATIENT
Start: 2017-02-17 | End: 2017-02-17

## 2017-02-17 RX ORDER — OLANZAPINE 15 MG/1
1.25 TABLET, FILM COATED ORAL EVERY 6 HOURS
Qty: 0 | Refills: 0 | Status: DISCONTINUED | OUTPATIENT
Start: 2017-02-17 | End: 2017-02-17

## 2017-02-17 RX ORDER — THIAMINE MONONITRATE (VIT B1) 100 MG
100 TABLET ORAL DAILY
Qty: 0 | Refills: 0 | Status: DISCONTINUED | OUTPATIENT
Start: 2017-02-17 | End: 2017-02-17

## 2017-02-17 RX ORDER — POTASSIUM PHOSPHATE, MONOBASIC POTASSIUM PHOSPHATE, DIBASIC 236; 224 MG/ML; MG/ML
15 INJECTION, SOLUTION INTRAVENOUS ONCE
Qty: 0 | Refills: 0 | Status: COMPLETED | OUTPATIENT
Start: 2017-02-17 | End: 2017-02-17

## 2017-02-17 RX ORDER — OLANZAPINE 15 MG/1
2.5 TABLET, FILM COATED ORAL EVERY 6 HOURS
Qty: 0 | Refills: 0 | Status: DISCONTINUED | OUTPATIENT
Start: 2017-02-17 | End: 2017-02-21

## 2017-02-17 RX ORDER — THIAMINE MONONITRATE (VIT B1) 100 MG
500 TABLET ORAL EVERY 8 HOURS
Qty: 0 | Refills: 0 | Status: DISCONTINUED | OUTPATIENT
Start: 2017-02-17 | End: 2017-02-17

## 2017-02-17 RX ORDER — PIPERACILLIN AND TAZOBACTAM 4; .5 G/20ML; G/20ML
3.38 INJECTION, POWDER, LYOPHILIZED, FOR SOLUTION INTRAVENOUS ONCE
Qty: 0 | Refills: 0 | Status: DISCONTINUED | OUTPATIENT
Start: 2017-02-17 | End: 2017-02-17

## 2017-02-17 RX ADMIN — ENOXAPARIN SODIUM 40 MILLIGRAM(S): 100 INJECTION SUBCUTANEOUS at 05:40

## 2017-02-17 RX ADMIN — Medication 100 MILLIEQUIVALENT(S): at 12:44

## 2017-02-17 RX ADMIN — Medication 1 APPLICATION(S): at 05:39

## 2017-02-17 RX ADMIN — OLANZAPINE 2.5 MILLIGRAM(S): 15 TABLET, FILM COATED ORAL at 06:00

## 2017-02-17 RX ADMIN — Medication 1 APPLICATION(S): at 01:07

## 2017-02-17 RX ADMIN — Medication 105 MILLIGRAM(S): at 05:40

## 2017-02-17 RX ADMIN — Medication 50 GRAM(S): at 14:05

## 2017-02-17 RX ADMIN — OLANZAPINE 2.5 MILLIGRAM(S): 15 TABLET, FILM COATED ORAL at 23:05

## 2017-02-17 RX ADMIN — Medication 100 MILLIEQUIVALENT(S): at 10:07

## 2017-02-17 RX ADMIN — OLANZAPINE 5 MILLIGRAM(S): 15 TABLET, FILM COATED ORAL at 17:40

## 2017-02-17 RX ADMIN — Medication 1 APPLICATION(S): at 23:26

## 2017-02-17 RX ADMIN — Medication 1 APPLICATION(S): at 12:44

## 2017-02-17 RX ADMIN — Medication 100 MILLIEQUIVALENT(S): at 11:16

## 2017-02-17 RX ADMIN — Medication 130 MILLIGRAM(S): at 01:07

## 2017-02-17 RX ADMIN — Medication 1 APPLICATION(S): at 17:39

## 2017-02-17 RX ADMIN — SODIUM CHLORIDE 75 MILLILITER(S): 9 INJECTION, SOLUTION INTRAVENOUS at 10:08

## 2017-02-17 RX ADMIN — POTASSIUM PHOSPHATE, MONOBASIC POTASSIUM PHOSPHATE, DIBASIC 62.5 MILLIMOLE(S): 236; 224 INJECTION, SOLUTION INTRAVENOUS at 17:38

## 2017-02-17 RX ADMIN — OLANZAPINE 1.25 MILLIGRAM(S): 15 TABLET, FILM COATED ORAL at 02:38

## 2017-02-17 NOTE — PROVIDER CONTACT NOTE (OTHER) - ACTION/TREATMENT ORDERED:
No treatment ordered: Give olanzapine PRN when patient is restless again. Reorient patient to reality before med. Dr. Bull aware. #[id number]

## 2017-02-18 LAB
BUN SERPL-MCNC: 5 MG/DL — LOW (ref 7–23)
BUN SERPL-MCNC: 7 MG/DL — SIGNIFICANT CHANGE UP (ref 7–23)
CALCIUM SERPL-MCNC: 8.7 MG/DL — SIGNIFICANT CHANGE UP (ref 8.4–10.5)
CALCIUM SERPL-MCNC: 9.1 MG/DL — SIGNIFICANT CHANGE UP (ref 8.4–10.5)
CHLORIDE SERPL-SCNC: 103 MMOL/L — SIGNIFICANT CHANGE UP (ref 98–107)
CHLORIDE SERPL-SCNC: 106 MMOL/L — SIGNIFICANT CHANGE UP (ref 98–107)
CO2 SERPL-SCNC: 17 MMOL/L — LOW (ref 22–31)
CO2 SERPL-SCNC: 19 MMOL/L — LOW (ref 22–31)
CREAT SERPL-MCNC: 0.63 MG/DL — SIGNIFICANT CHANGE UP (ref 0.5–1.3)
CREAT SERPL-MCNC: 0.69 MG/DL — SIGNIFICANT CHANGE UP (ref 0.5–1.3)
GLUCOSE SERPL-MCNC: 100 MG/DL — HIGH (ref 70–99)
GLUCOSE SERPL-MCNC: 64 MG/DL — LOW (ref 70–99)
HCT VFR BLD CALC: 38.5 % — LOW (ref 39–50)
HGB BLD-MCNC: 13.2 G/DL — SIGNIFICANT CHANGE UP (ref 13–17)
MAGNESIUM SERPL-MCNC: 1.8 MG/DL — SIGNIFICANT CHANGE UP (ref 1.6–2.6)
MAGNESIUM SERPL-MCNC: 2.1 MG/DL — SIGNIFICANT CHANGE UP (ref 1.6–2.6)
MCHC RBC-ENTMCNC: 32.6 PG — SIGNIFICANT CHANGE UP (ref 27–34)
MCHC RBC-ENTMCNC: 34.3 % — SIGNIFICANT CHANGE UP (ref 32–36)
MCV RBC AUTO: 95.1 FL — SIGNIFICANT CHANGE UP (ref 80–100)
PHOSPHATE SERPL-MCNC: 3.6 MG/DL — SIGNIFICANT CHANGE UP (ref 2.5–4.5)
PHOSPHATE SERPL-MCNC: 4.3 MG/DL — SIGNIFICANT CHANGE UP (ref 2.5–4.5)
PLATELET # BLD AUTO: 318 K/UL — SIGNIFICANT CHANGE UP (ref 150–400)
PMV BLD: 10.7 FL — SIGNIFICANT CHANGE UP (ref 7–13)
POTASSIUM SERPL-MCNC: 3.6 MMOL/L — SIGNIFICANT CHANGE UP (ref 3.5–5.3)
POTASSIUM SERPL-MCNC: 4.1 MMOL/L — SIGNIFICANT CHANGE UP (ref 3.5–5.3)
POTASSIUM SERPL-SCNC: 3.6 MMOL/L — SIGNIFICANT CHANGE UP (ref 3.5–5.3)
POTASSIUM SERPL-SCNC: 4.1 MMOL/L — SIGNIFICANT CHANGE UP (ref 3.5–5.3)
RBC # BLD: 4.05 M/UL — LOW (ref 4.2–5.8)
RBC # FLD: 12.8 % — SIGNIFICANT CHANGE UP (ref 10.3–14.5)
SODIUM SERPL-SCNC: 143 MMOL/L — SIGNIFICANT CHANGE UP (ref 135–145)
SODIUM SERPL-SCNC: 144 MMOL/L — SIGNIFICANT CHANGE UP (ref 135–145)
SPECIMEN SOURCE: SIGNIFICANT CHANGE UP
WBC # BLD: 8.1 K/UL — SIGNIFICANT CHANGE UP (ref 3.8–10.5)
WBC # FLD AUTO: 8.1 K/UL — SIGNIFICANT CHANGE UP (ref 3.8–10.5)

## 2017-02-18 PROCEDURE — 99233 SBSQ HOSP IP/OBS HIGH 50: CPT | Mod: GC

## 2017-02-18 RX ORDER — MAGNESIUM SULFATE 500 MG/ML
1 VIAL (ML) INJECTION ONCE
Qty: 0 | Refills: 0 | Status: COMPLETED | OUTPATIENT
Start: 2017-02-18 | End: 2017-02-18

## 2017-02-18 RX ORDER — OLANZAPINE 15 MG/1
2.5 TABLET, FILM COATED ORAL ONCE
Qty: 0 | Refills: 0 | Status: COMPLETED | OUTPATIENT
Start: 2017-02-18 | End: 2017-02-18

## 2017-02-18 RX ORDER — POTASSIUM CHLORIDE 20 MEQ
40 PACKET (EA) ORAL ONCE
Qty: 0 | Refills: 0 | Status: COMPLETED | OUTPATIENT
Start: 2017-02-18 | End: 2017-02-18

## 2017-02-18 RX ADMIN — OLANZAPINE 5 MILLIGRAM(S): 15 TABLET, FILM COATED ORAL at 18:55

## 2017-02-18 RX ADMIN — OLANZAPINE 2.5 MILLIGRAM(S): 15 TABLET, FILM COATED ORAL at 00:43

## 2017-02-18 RX ADMIN — ENOXAPARIN SODIUM 40 MILLIGRAM(S): 100 INJECTION SUBCUTANEOUS at 07:32

## 2017-02-18 RX ADMIN — Medication 1 APPLICATION(S): at 07:32

## 2017-02-18 RX ADMIN — Medication 40 MILLIEQUIVALENT(S): at 10:45

## 2017-02-18 RX ADMIN — Medication 100 GRAM(S): at 10:45

## 2017-02-18 RX ADMIN — Medication 1 APPLICATION(S): at 10:47

## 2017-02-18 RX ADMIN — Medication 1 APPLICATION(S): at 18:42

## 2017-02-19 LAB
BUN SERPL-MCNC: 8 MG/DL — SIGNIFICANT CHANGE UP (ref 7–23)
CALCIUM SERPL-MCNC: 8.8 MG/DL — SIGNIFICANT CHANGE UP (ref 8.4–10.5)
CHLORIDE SERPL-SCNC: 104 MMOL/L — SIGNIFICANT CHANGE UP (ref 98–107)
CO2 SERPL-SCNC: 17 MMOL/L — LOW (ref 22–31)
CREAT SERPL-MCNC: 0.75 MG/DL — SIGNIFICANT CHANGE UP (ref 0.5–1.3)
GLUCOSE SERPL-MCNC: 80 MG/DL — SIGNIFICANT CHANGE UP (ref 70–99)
HCT VFR BLD CALC: 40.5 % — SIGNIFICANT CHANGE UP (ref 39–50)
HGB BLD-MCNC: 13.4 G/DL — SIGNIFICANT CHANGE UP (ref 13–17)
MAGNESIUM SERPL-MCNC: 1.9 MG/DL — SIGNIFICANT CHANGE UP (ref 1.6–2.6)
MCHC RBC-ENTMCNC: 31.8 PG — SIGNIFICANT CHANGE UP (ref 27–34)
MCHC RBC-ENTMCNC: 33.1 % — SIGNIFICANT CHANGE UP (ref 32–36)
MCV RBC AUTO: 96 FL — SIGNIFICANT CHANGE UP (ref 80–100)
PHOSPHATE SERPL-MCNC: 3.7 MG/DL — SIGNIFICANT CHANGE UP (ref 2.5–4.5)
PLATELET # BLD AUTO: 344 K/UL — SIGNIFICANT CHANGE UP (ref 150–400)
PMV BLD: 10.3 FL — SIGNIFICANT CHANGE UP (ref 7–13)
POTASSIUM SERPL-MCNC: 5 MMOL/L — SIGNIFICANT CHANGE UP (ref 3.5–5.3)
POTASSIUM SERPL-SCNC: 5 MMOL/L — SIGNIFICANT CHANGE UP (ref 3.5–5.3)
RBC # BLD: 4.22 M/UL — SIGNIFICANT CHANGE UP (ref 4.2–5.8)
RBC # FLD: 13.4 % — SIGNIFICANT CHANGE UP (ref 10.3–14.5)
SODIUM SERPL-SCNC: 140 MMOL/L — SIGNIFICANT CHANGE UP (ref 135–145)
WBC # BLD: 10.27 K/UL — SIGNIFICANT CHANGE UP (ref 3.8–10.5)
WBC # FLD AUTO: 10.27 K/UL — SIGNIFICANT CHANGE UP (ref 3.8–10.5)

## 2017-02-19 PROCEDURE — 99233 SBSQ HOSP IP/OBS HIGH 50: CPT | Mod: GC

## 2017-02-19 RX ORDER — IBUPROFEN 200 MG
600 TABLET ORAL ONCE
Qty: 0 | Refills: 0 | Status: COMPLETED | OUTPATIENT
Start: 2017-02-19 | End: 2017-02-19

## 2017-02-19 RX ORDER — THIAMINE MONONITRATE (VIT B1) 100 MG
100 TABLET ORAL DAILY
Qty: 0 | Refills: 0 | Status: DISCONTINUED | OUTPATIENT
Start: 2017-02-19 | End: 2017-02-21

## 2017-02-19 RX ORDER — LIDOCAINE 4 G/100G
1 CREAM TOPICAL ONCE
Qty: 0 | Refills: 0 | Status: COMPLETED | OUTPATIENT
Start: 2017-02-19 | End: 2017-02-19

## 2017-02-19 RX ORDER — FOLIC ACID 0.8 MG
1 TABLET ORAL DAILY
Qty: 0 | Refills: 0 | Status: DISCONTINUED | OUTPATIENT
Start: 2017-02-19 | End: 2017-02-21

## 2017-02-19 RX ORDER — IBUPROFEN 200 MG
600 TABLET ORAL ONCE
Qty: 0 | Refills: 0 | Status: COMPLETED | OUTPATIENT
Start: 2017-02-19 | End: 2018-01-18

## 2017-02-19 RX ADMIN — Medication 1 APPLICATION(S): at 12:16

## 2017-02-19 RX ADMIN — Medication 600 MILLIGRAM(S): at 22:43

## 2017-02-19 RX ADMIN — Medication 1 TABLET(S): at 12:15

## 2017-02-19 RX ADMIN — ENOXAPARIN SODIUM 40 MILLIGRAM(S): 100 INJECTION SUBCUTANEOUS at 06:38

## 2017-02-19 RX ADMIN — Medication 600 MILLIGRAM(S): at 08:47

## 2017-02-19 RX ADMIN — Medication 1 APPLICATION(S): at 17:53

## 2017-02-19 RX ADMIN — OLANZAPINE 5 MILLIGRAM(S): 15 TABLET, FILM COATED ORAL at 17:53

## 2017-02-19 RX ADMIN — Medication 1 MILLIGRAM(S): at 12:13

## 2017-02-19 RX ADMIN — Medication 600 MILLIGRAM(S): at 21:59

## 2017-02-19 RX ADMIN — Medication 1 APPLICATION(S): at 06:38

## 2017-02-19 RX ADMIN — Medication 100 MILLIGRAM(S): at 12:13

## 2017-02-19 RX ADMIN — Medication 1 APPLICATION(S): at 00:13

## 2017-02-19 RX ADMIN — LIDOCAINE 1 APPLICATION(S): 4 CREAM TOPICAL at 09:35

## 2017-02-20 LAB
BUN SERPL-MCNC: 10 MG/DL — SIGNIFICANT CHANGE UP (ref 7–23)
BUN SERPL-MCNC: 11 MG/DL — SIGNIFICANT CHANGE UP (ref 7–23)
CALCIUM SERPL-MCNC: 8.8 MG/DL — SIGNIFICANT CHANGE UP (ref 8.4–10.5)
CALCIUM SERPL-MCNC: 9.4 MG/DL — SIGNIFICANT CHANGE UP (ref 8.4–10.5)
CHLORIDE SERPL-SCNC: 103 MMOL/L — SIGNIFICANT CHANGE UP (ref 98–107)
CHLORIDE SERPL-SCNC: 103 MMOL/L — SIGNIFICANT CHANGE UP (ref 98–107)
CO2 SERPL-SCNC: 25 MMOL/L — SIGNIFICANT CHANGE UP (ref 22–31)
CO2 SERPL-SCNC: 26 MMOL/L — SIGNIFICANT CHANGE UP (ref 22–31)
CREAT SERPL-MCNC: 0.69 MG/DL — SIGNIFICANT CHANGE UP (ref 0.5–1.3)
CREAT SERPL-MCNC: 0.76 MG/DL — SIGNIFICANT CHANGE UP (ref 0.5–1.3)
GLUCOSE SERPL-MCNC: 123 MG/DL — HIGH (ref 70–99)
GLUCOSE SERPL-MCNC: 91 MG/DL — SIGNIFICANT CHANGE UP (ref 70–99)
MAGNESIUM SERPL-MCNC: 1.7 MG/DL — SIGNIFICANT CHANGE UP (ref 1.6–2.6)
MAGNESIUM SERPL-MCNC: 1.7 MG/DL — SIGNIFICANT CHANGE UP (ref 1.6–2.6)
PHOSPHATE SERPL-MCNC: 3.1 MG/DL — SIGNIFICANT CHANGE UP (ref 2.5–4.5)
PHOSPHATE SERPL-MCNC: 3.7 MG/DL — SIGNIFICANT CHANGE UP (ref 2.5–4.5)
POTASSIUM SERPL-MCNC: 3.5 MMOL/L — SIGNIFICANT CHANGE UP (ref 3.5–5.3)
POTASSIUM SERPL-MCNC: 3.9 MMOL/L — SIGNIFICANT CHANGE UP (ref 3.5–5.3)
POTASSIUM SERPL-SCNC: 3.5 MMOL/L — SIGNIFICANT CHANGE UP (ref 3.5–5.3)
POTASSIUM SERPL-SCNC: 3.9 MMOL/L — SIGNIFICANT CHANGE UP (ref 3.5–5.3)
SODIUM SERPL-SCNC: 142 MMOL/L — SIGNIFICANT CHANGE UP (ref 135–145)
SODIUM SERPL-SCNC: 144 MMOL/L — SIGNIFICANT CHANGE UP (ref 135–145)

## 2017-02-20 PROCEDURE — 93306 TTE W/DOPPLER COMPLETE: CPT | Mod: 26

## 2017-02-20 PROCEDURE — 99232 SBSQ HOSP IP/OBS MODERATE 35: CPT | Mod: GC

## 2017-02-20 RX ADMIN — Medication 1 APPLICATION(S): at 18:20

## 2017-02-20 RX ADMIN — OLANZAPINE 5 MILLIGRAM(S): 15 TABLET, FILM COATED ORAL at 18:20

## 2017-02-20 RX ADMIN — Medication 1 APPLICATION(S): at 12:37

## 2017-02-20 RX ADMIN — Medication 1 MILLIGRAM(S): at 12:38

## 2017-02-20 RX ADMIN — Medication 1 APPLICATION(S): at 06:30

## 2017-02-20 RX ADMIN — Medication 1 TABLET(S): at 12:38

## 2017-02-20 RX ADMIN — Medication 100 MILLIGRAM(S): at 12:38

## 2017-02-20 RX ADMIN — Medication 1 APPLICATION(S): at 23:32

## 2017-02-20 RX ADMIN — ENOXAPARIN SODIUM 40 MILLIGRAM(S): 100 INJECTION SUBCUTANEOUS at 06:30

## 2017-02-21 PROCEDURE — 99232 SBSQ HOSP IP/OBS MODERATE 35: CPT | Mod: GC

## 2017-02-21 RX ORDER — FOLIC ACID 0.8 MG
1 TABLET ORAL DAILY
Qty: 0 | Refills: 0 | Status: DISCONTINUED | OUTPATIENT
Start: 2017-02-21 | End: 2017-03-01

## 2017-02-21 RX ORDER — PETROLATUM,WHITE
1 JELLY (GRAM) TOPICAL EVERY 4 HOURS
Qty: 0 | Refills: 0 | Status: DISCONTINUED | OUTPATIENT
Start: 2017-02-21 | End: 2017-03-01

## 2017-02-21 RX ORDER — THIAMINE MONONITRATE (VIT B1) 100 MG
100 TABLET ORAL ONCE
Qty: 0 | Refills: 0 | Status: COMPLETED | OUTPATIENT
Start: 2017-02-21 | End: 2017-02-21

## 2017-02-21 RX ADMIN — Medication 1 APPLICATION(S): at 21:49

## 2017-02-21 RX ADMIN — Medication 1 APPLICATION(S): at 21:48

## 2017-02-21 RX ADMIN — Medication 1 TABLET(S): at 12:46

## 2017-02-21 RX ADMIN — Medication 1 APPLICATION(S): at 12:47

## 2017-02-21 RX ADMIN — Medication 1 APPLICATION(S): at 17:24

## 2017-02-21 RX ADMIN — Medication 1 MILLIGRAM(S): at 12:46

## 2017-02-21 RX ADMIN — Medication 1 APPLICATION(S): at 06:31

## 2017-02-21 RX ADMIN — Medication 100 MILLIGRAM(S): at 12:46

## 2017-02-21 NOTE — PHYSICAL THERAPY INITIAL EVALUATION ADULT - PERTINENT HX OF CURRENT PROBLEM, REHAB EVAL
47 yo M with h/o EtOH use, chronic rash p/w worsening rash and EtOH withdrawal. Pt recently admitted with similar complaints and discharged with topical treatments but says he has not noticed any improvement. He thinks rash has gotten more diffuse including legs and back, continues to itch. Hands are painful.

## 2017-02-22 ENCOUNTER — TRANSCRIPTION ENCOUNTER (OUTPATIENT)
Age: 47
End: 2017-02-22

## 2017-02-22 LAB
BACTERIA BLD CULT: SIGNIFICANT CHANGE UP
NT-PROBNP SERPL-SCNC: 238.1 PG/ML — SIGNIFICANT CHANGE UP

## 2017-02-22 PROCEDURE — 99232 SBSQ HOSP IP/OBS MODERATE 35: CPT | Mod: GC

## 2017-02-22 RX ADMIN — Medication 1 TABLET(S): at 13:06

## 2017-02-22 RX ADMIN — Medication 1 MILLIGRAM(S): at 13:06

## 2017-02-22 RX ADMIN — Medication 1 APPLICATION(S): at 17:31

## 2017-02-22 RX ADMIN — Medication 1 APPLICATION(S): at 06:21

## 2017-02-22 RX ADMIN — Medication 100 MILLIGRAM(S): at 21:00

## 2017-02-22 RX ADMIN — Medication 1 APPLICATION(S): at 13:06

## 2017-02-22 NOTE — DISCHARGE NOTE ADULT - SECONDARY DIAGNOSIS.
Wernicke encephalopathy ETOH abuse Irritant contact dermatitis due to other chemical products Cardiomyopathy, unspecified type Aspiration pneumonia, unspecified aspiration pneumonia type, unspecified laterality, unspecified part of lung

## 2017-02-22 NOTE — DISCHARGE NOTE ADULT - CARE PLAN
Principal Discharge DX:	Alcohol withdrawal syndrome, with delirium  Goal:	Cessation of drinking, prevention of subsequent episodes of withdrawal  Instructions for follow-up, activity and diet:	You were admitted to the hospital and at the time of presentation were already exhibiting signs of alcohol withdrawal having had your last drink two days prior. You were put on a protocol to monitor and manage withdrawal called "CIWA," first with Ativan as needed. However, your requirements for Ativan were so frequency that you were transitioned to phenobarbital instead. Despite this medication, you were noted to be very agitated, your heart was very fast and your mental status was such that there was concern about your ability to protect your airway, so the decision was made to intubate you and bring you to the ICU. In the ICU your symptoms of withdrawal continued to be managed until you were able to be safely extubated and transferred to the medicine floors. When you returned to the floor you were continued for a few more days and were still agitated, though given how long you'd been in the hospital it was believed you were no longer withdrawing actively from alcohol and rather your agitation was the result of Wernicke's encephalopathy (see below). At this point the protocol was discontinued. Thereafter, you become alert, oriented and responsive. The social worked spoke with you and a plan was made to discharge you to inpatient alcohol rehab to work on your recovery and cessation of alcohol to prevent any complications such as this in the future.  Secondary Diagnosis:	Wernicke encephalopathy  Goal:	Compliance with daily vitamin supplementation, prevention of future episodes  Instructions for follow-up, activity and diet:	Shortly after entering the hospital you were noted to be highly agitated and also confused, disoriented and eventually to the point of being obtunded. Though some of these symptoms can be attributed to alcohol withdrawal, the degree of your mental status impairment was significant enough that the psychiatry service was concerned you had something called Wernicke's encephalopathy. This is a type of alteration in mental status owing to a deficiency of the vitamin thiamine (vitamin B1), which people who abuse alcohol are likely to develop. You were given thiamine through an intravenous line for three days and then in a bag continuously that also contained folic acid and a multivitamin to supplement other nutrients often lacking in patient with alcohol abuse. Your mental status was impaired enough that you were unable to swallow effectively and there was concern about the risk of aspirating into your lungs, so you were not allowed to eat anything but mouth and were hydrated with fluids in the IV alone. After a period of time owing to concerns for your nutrition a nasogastric feeding tube was placed which you received nutrients through for a few days until you awoke and were able to eat on your own. You will continue to take daily oral supplements of thiamine, folic acid and a multivitamin in rehab and should continue doing so daily to prevent another episode like this happening again, though the best way to prevent it is to abstain from alcohol entirely.  Secondary Diagnosis:	ETOH abuse  Goal:	Cessation of alcohol, recovery with support of rehab  Instructions for follow-up, activity and diet:	You are being discharged to an inpatient rehabilitation facility for people who struggle with alcohol abuse. There, you will received the support you need to help cease drinking entirely and prevent the many complications that can arise from alcohol abuse.  Secondary Diagnosis:	Irritant contact dermatitis due to other chemical products  Goal:	Symptomatic relief, follow up with dermatology  Instructions for follow-up, activity and diet:	When you were here in the hospital the dermatology team saw you to evaluate the rash on your body and determined the most likely cause is something called irritant contact dermatitis, a type of irritation from various chemicals and solvents that you may be exposed to at work. It is not possible at this time to say what exposure particularly is the cause, but they would like you to follow up with them within the next month for follow up. The dermatologist who saw you is named Dr. Veronica Oconnell and her contact information is provided in this paperwork. In the meantime, please continue to use the two topical medications they prescribed you and which helped damage your rash in the hospital. They are triamcinolone and clobetasol. Clobetasol is a more potent steroid that can be used on thicker skin such as that found on the palms and soles of the feet, as well as any red patches of skin that have a thick scale or a lot of dryness. The remaining parts of the body should be treated with triamcinolone. You should apply both twice daily as needed when your rash flares and then, if possible, discontinue use for a week to give your skin a break before resuming again. Please also use emollients such as Aquaphor (available over the counter) or vaseline as often as needed to help with the dryness and burning.  Secondary Diagnosis:	Cardiomyopathy, unspecified type  Goal:	Follow up with cardiomyopathy  Instructions for follow-up, activity and diet:	An ultrasound of your heart in the ICU showed that your heart was not pumping as effectively as it should be. When you were more alert and stable a definitive view of your heart called an echocardiogram was performed which showed that you have some dysfunction in the ability of your heart to pump as strongly as it should be with each beat, which is likely the effect of alcohol and something called "alcoholic cardiomyopathy." Based on your lack of symptoms and a laboratory value called a pro-BNP which was not very elevated, there was no acute intervention needed and no medications that needed to be started. However, it is strongly recommended that you follow up with a cardiologist when you are able for further assessment. Principal Discharge DX:	Alcohol withdrawal syndrome, with delirium  Goal:	Cessation of drinking, prevention of subsequent episodes of withdrawal  Instructions for follow-up, activity and diet:	You were admitted to the hospital and at the time of presentation were already exhibiting signs of alcohol withdrawal having had your last drink two days prior. You were put on a protocol to monitor and manage withdrawal called "CIWA," first with Ativan as needed. However, your requirements for Ativan were so frequency that you were transitioned to phenobarbital instead. Despite this medication, you were noted to be very agitated, your heart was very fast and your mental status was such that there was concern about your ability to protect your airway, so the decision was made to intubate you and bring you to the ICU. In the ICU your symptoms of withdrawal continued to be managed until you were able to be safely extubated and transferred to the medicine floors. When you returned to the floor you were continued for a few more days and were still agitated, though given how long you'd been in the hospital it was believed you were no longer withdrawing actively from alcohol and rather your agitation was the result of Wernicke's encephalopathy (see below). At this point the protocol was discontinued. Thereafter, you become alert, oriented and responsive. The social worked spoke with you and a plan was made to discharge you to inpatient alcohol rehab to work on your recovery and cessation of alcohol to prevent any complications such as this in the future.  Secondary Diagnosis:	Wernicke encephalopathy  Goal:	Compliance with daily vitamin supplementation, prevention of future episodes  Instructions for follow-up, activity and diet:	Shortly after entering the hospital you were noted to be highly agitated and also confused, disoriented and eventually to the point of being obtunded. Though some of these symptoms can be attributed to alcohol withdrawal, the degree of your mental status impairment was significant enough that the psychiatry service was concerned you had something called Wernicke's encephalopathy. This is a type of alteration in mental status owing to a deficiency of the vitamin thiamine (vitamin B1), which people who abuse alcohol are likely to develop. You were given thiamine through an intravenous line for three days and then in a bag continuously that also contained folic acid and a multivitamin to supplement other nutrients often lacking in patient with alcohol abuse. Your mental status was impaired enough that you were unable to swallow effectively and there was concern about the risk of aspirating into your lungs, so you were not allowed to eat anything but mouth and were hydrated with fluids in the IV alone. After a period of time owing to concerns for your nutrition a nasogastric feeding tube was placed which you received nutrients through for a few days until you awoke and were able to eat on your own. You will continue to take daily oral supplements of thiamine, folic acid and a multivitamin in rehab and should continue doing so daily to prevent another episode like this happening again, though the best way to prevent it is to abstain from alcohol entirely.  Secondary Diagnosis:	ETOH abuse  Goal:	Cessation of alcohol, recovery with support of rehab  Instructions for follow-up, activity and diet:	You are being discharged to an inpatient rehabilitation facility for people who struggle with alcohol abuse. There, you will received the support you need to help cease drinking entirely and prevent the many complications that can arise from alcohol abuse.  Secondary Diagnosis:	Irritant contact dermatitis due to other chemical products  Goal:	Symptomatic relief, follow up with dermatology  Instructions for follow-up, activity and diet:	When you were here in the hospital the dermatology team saw you to evaluate the rash on your body and determined the most likely cause is something called irritant contact dermatitis, a type of irritation from various chemicals and solvents that you may be exposed to at work. It is not possible at this time to say what exposure particularly is the cause, but they would like you to follow up with them within the next month for follow up. The dermatologist who saw you is named Dr. Veronica Oconnell and her contact information is provided in this paperwork. In the meantime, please continue to use the two topical medications they prescribed you and which helped damage your rash in the hospital. They are triamcinolone and clobetasol. Clobetasol is a more potent steroid that can be used on thicker skin such as that found on the palms and soles of the feet, as well as any red patches of skin that have a thick scale or a lot of dryness. The remaining parts of the body should be treated with triamcinolone. You should apply both twice daily as needed when your rash flares and then, if possible, discontinue use for a week to give your skin a break before resuming again. Please also use emollients such as Aquaphor (available over the counter) or vaseline as often as needed to help with the dryness and burning.  Secondary Diagnosis:	Cardiomyopathy, unspecified type  Goal:	Follow up with cardiomyopathy  Instructions for follow-up, activity and diet:	An ultrasound of your heart in the ICU showed that your heart was not pumping as effectively as it should be. When you were more alert and stable a definitive view of your heart called an echocardiogram was performed which showed that you have some dysfunction in the ability of your heart to pump as strongly as it should be with each beat, which is likely the effect of alcohol and something called "alcoholic cardiomyopathy." Based on your lack of symptoms and a laboratory value called a pro-BNP which was not very elevated, there was no acute intervention needed and no medications that needed to be started. However, it is strongly recommended that you follow up with a cardiologist when you are able for further assessment.  Secondary Diagnosis:	Aspiration pneumonia, unspecified aspiration pneumonia type, unspecified laterality, unspecified part of lung  Goal:	Completion of antibiotics  Instructions for follow-up, activity and diet:	You had a fever later in your hospital course but were without signs of symptoms of infection. Blood cultures were negative for any bacteria. Because you had a cough and were at risk for an aspiration pneumonia due to your mental status earlier in hospitalization and the feeding tube that was used for a few days, you were put on an antibiotic called Levaquin. You need to take this for three more days through 2/27 to complete the full course. Principal Discharge DX:	Alcohol withdrawal syndrome, with delirium  Goal:	Cessation of drinking, prevention of subsequent episodes of withdrawal  Instructions for follow-up, activity and diet:	You were admitted to the hospital and at the time of presentation were already exhibiting signs of alcohol withdrawal having had your last drink two days prior. You were put on a protocol to monitor and manage withdrawal called "CIWA," first with Ativan as needed. However, your requirements for Ativan were so frequency that you were transitioned to phenobarbital instead. Despite this medication, you were noted to be very agitated, your heart was very fast and your mental status was such that there was concern about your ability to protect your airway, so the decision was made to intubate you and bring you to the ICU. In the ICU your symptoms of withdrawal continued to be managed until you were able to be safely extubated and transferred to the medicine floors. When you returned to the floor you were continued for a few more days and were still agitated, though given how long you'd been in the hospital it was believed you were no longer withdrawing actively from alcohol and rather your agitation was the result of Wernicke's encephalopathy (see below). At this point the protocol was discontinued. Thereafter, you become alert, oriented and responsive. The social worked spoke with you and a plan was made to discharge you to inpatient alcohol rehab to work on your recovery and cessation of alcohol to prevent any complications such as this in the future.  Secondary Diagnosis:	Wernicke encephalopathy  Goal:	Compliance with daily vitamin supplementation, prevention of future episodes  Instructions for follow-up, activity and diet:	Shortly after entering the hospital you were noted to be highly agitated and also confused, disoriented and eventually to the point of being obtunded. Though some of these symptoms can be attributed to alcohol withdrawal, the degree of your mental status impairment was significant enough that the psychiatry service was concerned you had something called Wernicke's encephalopathy. This is a type of alteration in mental status owing to a deficiency of the vitamin thiamine (vitamin B1), which people who abuse alcohol are likely to develop. You were given thiamine through an intravenous line for three days and then in a bag continuously that also contained folic acid and a multivitamin to supplement other nutrients often lacking in patient with alcohol abuse. Your mental status was impaired enough that you were unable to swallow effectively and there was concern about the risk of aspirating into your lungs, so you were not allowed to eat anything but mouth and were hydrated with fluids in the IV alone. After a period of time owing to concerns for your nutrition a nasogastric feeding tube was placed which you received nutrients through for a few days until you awoke and were able to eat on your own. You will continue to take daily oral supplements of thiamine, folic acid and a multivitamin in rehab and should continue doing so daily to prevent another episode like this happening again, though the best way to prevent it is to abstain from alcohol entirely.  Secondary Diagnosis:	ETOH abuse  Goal:	Cessation of alcohol, recovery with support of rehab  Instructions for follow-up, activity and diet:	You are being discharged to an inpatient rehabilitation facility for people who struggle with alcohol abuse. There, you will received the support you need to help cease drinking entirely and prevent the many complications that can arise from alcohol abuse.  Secondary Diagnosis:	Irritant contact dermatitis due to other chemical products  Goal:	Symptomatic relief, follow up with dermatology  Instructions for follow-up, activity and diet:	When you were here in the hospital the dermatology team saw you to evaluate the rash on your body and determined the most likely cause is something called irritant contact dermatitis, a type of irritation from various chemicals and solvents that you may be exposed to at work. It is not possible at this time to say what exposure particularly is the cause, but they would like you to follow up with them within the next month for follow up. The dermatologist who saw you is named Dr. Veronica Oconnell and her contact information is provided in this paperwork. In the meantime, please continue to use the two topical medications they prescribed you and which helped damage your rash in the hospital. They are triamcinolone and clobetasol. We discontinued Clobetasol as it is a potent steroid and can cause thinning of the skin. Please also use emollients such as Aquaphor (available over the counter) or vaseline as often as needed to help with the dryness and burning.  Secondary Diagnosis:	Cardiomyopathy, unspecified type  Goal:	Follow up with cardiomyopathy  Instructions for follow-up, activity and diet:	An ultrasound of your heart in the ICU showed that your heart was not pumping as effectively as it should be. When you were more alert and stable a definitive view of your heart called an echocardiogram was performed which showed that you have some dysfunction in the ability of your heart to pump as strongly as it should be with each beat, which is likely the effect of alcohol and something called "alcoholic cardiomyopathy." Based on your lack of symptoms and a laboratory value called a pro-BNP which was not very elevated, there was no acute intervention needed and no medications that needed to be started. However, it is strongly recommended that you follow up with a cardiologist when you are able for further assessment.  Secondary Diagnosis:	Aspiration pneumonia, unspecified aspiration pneumonia type, unspecified laterality, unspecified part of lung  Goal:	Completion of antibiotics  Instructions for follow-up, activity and diet:	You had a fever later in your hospital course but were without signs of symptoms of infection. Blood cultures were negative for any bacteria. Because you had a cough and were at risk for an aspiration pneumonia due to your mental status earlier in hospitalization and the feeding tube that was used for a few days, you were put on an antibiotic called Levaquin and completed a full course.

## 2017-02-22 NOTE — DISCHARGE NOTE ADULT - HOSPITAL COURSE
HPI:  45 yo M with h/o EtOH use, chronic rash p/w worsening rash and EtOH withdrawal. Pt recently admitted with similar complaints and discharged with topical treatments but says he has not noticed any improvement. He thinks rash has gotten more diffuse including legs and back, continues to itch. Hands are painful. He denies any fevers or chills. He has no known allergies and has not changed his diet. He denies exposure to bed bugs. He has two roommates, neither have any rashes. He has not taken any new meds besides creams prescribed last admission. He has no GI or  symptoms. No SOB or congestion, no airway compromise.    Also of note, pt thinks he is withdrawing from alcohol. Feels tremulous, slight headache, photophobia. His last drink was 2 days ago. Says he usually drinks 12 beers per day. He has never had withdrawal seizures or DT. Last admission pt given ativan ativan taper, monitored on ciwa protocol. Also given high dose IV thiamine.       ED VS: 153/107  98  98.6  17  96% on RA  Pt was given valium 10mg IV and banana bag started       Hospital Course:  Patient in the ER noted to have likely alcohol withdrawal and also concern for Wernicke's encephalopathy. He received Valium but was noted to have CIWA score of 14 after dose and so was given one dose of Librium and then started on Ativan taper. Patient was also started on a banana bag as well as IV thiamine 500mg for three days. Patient was transferred to the medicine floors for management of alcohol withdrawal and pending dermatology consult for diffuse erythematous scaly plaques and papules.     On the medicine floors patient was continued on CIWA protocol and Ativan taper. Dermatology believed rash was most likely an irritant contact dermatitis secondary to various occupational exposures and recommended topical triamcinolone and topical clobetasol ointment which the patient improved on. On 2/8 patient became very agitated and was given 18g of Ativan over a 10 hour time period, after which point he was given phenobarbital 130mg x 9 later in the day owing to persistently elevated CIWA scores and agitation. He was at that point brought to the MICU where he was intubated and sedated for airway protection.    In the MICU patient became hypotensive secondary to sedation and required levophed for pressor support. He remained agitated, requiring Fentanyl. He was started on maintenance fluids for low urine output but they were discontinued after concern for fluid overload on evidence of left ventricular dysfunction on bedside echocardiogram. On 2/13 patient was titrated off fentanyl, versed and propofol and extubated to a face tent where he saturated well and was not found to be agitated. At that point patient's agitation was managed intermittently as needed with IV pushes of phenobarbital 130mg. Patient was determined to be medically stable for transfer back to the medicine floors.    On his second admission to the medicine floors patient was maintained on a high risk symptom-triggered CIWA protocol with phenobarbital and was continued on banana bag as at that point owing to significantly altered mental status patient remained NPO. He was obtunded, minimally responsive with intermittent agitation. He was also tachycardic with some bilateral hand tremors and requiring increasing frequency of doses of phenobarbital. Psychiatry re-evaluated the patient and determined that given the number of days since patient's admission and last alcohol intake it was very unlikely that patient was still withdrawing and believed that his agitation was most likely delirium secondary to ongoing Wernicke's encephalopathy. At this point patient's CIWA protocol was discontinued and he was given ziprasidone as needed for agitation. Because patient had been NPO for ~6 days with evidence of ketonuria the decision was made to place an NG tube for nutrition. With recommendations from nutritionist patient was started on feeds and metabolic panel was monitored every 12 hours to assess for signs of refeeding syndrome. Patient tolerated feeds without issue and two days after placement was noted to be alert, responsive and fully conversational without any agitation or confusion. At the patient's request his Moon was removed and he passed trial of void; his NG tube was also removed and he was started on a regular diet. He was at that point transitioned to oral forms of folic acid, multivitamin and thiamine which he received daily; banana bag was discontinued. Social work evaluated the patient and in accordance with both patient and family the decision was made to arrange discharge to inpatient alcohol rehab.    Of note, the left ventricular dysfunction noted on bedside ultrasound in the MICU was evaluated definitively with transthoracic echocardiogram once patient was alert and stabilized on the medical floor. Echo showed moderate global left ventricular systolic dysfunction, mild diastolic dysfunction (Stage I), normal right ventricular size and function and no significant valvular disease. Ejection fraction was 44%. These findings were believed to be secondary to likely alcoholic cardiomyopathy. Pro-BNP was checked and found to be less than 300; as a result no acute intervention was pursued and patient was recommended to cardiology follow up on discharge.     Patient was discharged to inpatient alcohol rehab at the [facility] on _____ stable and in good condition with recommendation to continue daily supplementation of thiamine, multivitamin and folic acid. HPI:  45 yo M with h/o EtOH use, chronic rash p/w worsening rash and EtOH withdrawal. Pt recently admitted with similar complaints and discharged with topical treatments but says he has not noticed any improvement. He thinks rash has gotten more diffuse including legs and back, continues to itch. Hands are painful. He denies any fevers or chills. He has no known allergies and has not changed his diet. He denies exposure to bed bugs. He has two roommates, neither have any rashes. He has not taken any new meds besides creams prescribed last admission. He has no GI or  symptoms. No SOB or congestion, no airway compromise.    Also of note, pt thinks he is withdrawing from alcohol. Feels tremulous, slight headache, photophobia. His last drink was 2 days ago. Says he usually drinks 12 beers per day. He has never had withdrawal seizures or DT. Last admission pt given ativan ativan taper, monitored on ciwa protocol. Also given high dose IV thiamine.       ED VS: 153/107  98  98.6  17  96% on RA  Pt was given valium 10mg IV and banana bag started       Hospital Course:  Patient in the ER noted to have likely alcohol withdrawal and also concern for Wernicke's encephalopathy. He received Valium but was noted to have CIWA score of 14 after dose and so was given one dose of Librium and then started on Ativan taper. Patient was also started on a banana bag as well as IV thiamine 500mg for three days. Patient was transferred to the medicine floors for management of alcohol withdrawal and pending dermatology consult for diffuse erythematous scaly plaques and papules.     On the medicine floors patient was continued on CIWA protocol and Ativan taper. Dermatology believed rash was most likely an irritant contact dermatitis secondary to various occupational exposures and recommended topical triamcinolone and topical clobetasol ointment which the patient improved on. On 2/8 patient became very agitated and was given 18g of Ativan over a 10 hour time period, after which point he was given phenobarbital 130mg x 9 later in the day owing to persistently elevated CIWA scores and agitation. He was at that point brought to the MICU where he was intubated and sedated for airway protection.    In the MICU patient became hypotensive secondary to sedation and required levophed for pressor support. He remained agitated, requiring Fentanyl. He was started on maintenance fluids for low urine output but they were discontinued after concern for fluid overload on evidence of left ventricular dysfunction on bedside echocardiogram. On 2/13 patient was titrated off fentanyl, versed and propofol and extubated to a face tent where he saturated well and was not found to be agitated. At that point patient's agitation was managed intermittently as needed with IV pushes of phenobarbital 130mg. Patient was determined to be medically stable for transfer back to the medicine floors.    On his second admission to the medicine floors patient was maintained on a high risk symptom-triggered CIWA protocol with phenobarbital and was continued on banana bag as at that point owing to significantly altered mental status patient remained NPO. He was obtunded, minimally responsive with intermittent agitation. He was also tachycardic with some bilateral hand tremors and requiring increasing frequency of doses of phenobarbital. Psychiatry re-evaluated the patient and determined that given the number of days since patient's admission and last alcohol intake it was very unlikely that patient was still withdrawing and believed that his agitation was most likely delirium secondary to ongoing Wernicke's encephalopathy. At this point patient's CIWA protocol was discontinued and he was given ziprasidone as needed for agitation. Because patient had been NPO for ~6 days with evidence of ketonuria the decision was made to place an NG tube for nutrition. With recommendations from nutritionist patient was started on feeds and metabolic panel was monitored every 12 hours to assess for signs of refeeding syndrome. Patient tolerated feeds without issue and two days after placement was noted to be alert, responsive and fully conversational without any agitation or confusion. At the patient's request his Moon was removed and he passed trial of void; his NG tube was also removed and he was started on a regular diet. He was at that point transitioned to oral forms of folic acid, multivitamin and thiamine which he received daily; banana bag was discontinued. Social work evaluated the patient and in accordance with both patient and family the decision was made to arrange discharge to inpatient alcohol rehab.    Of note, the left ventricular dysfunction noted on bedside ultrasound in the MICU was evaluated definitively with transthoracic echocardiogram once patient was alert and stabilized on the medical floor. Echo showed moderate global left ventricular systolic dysfunction, mild diastolic dysfunction (Stage I), normal right ventricular size and function and no significant valvular disease. Ejection fraction was 44%. These findings were believed to be secondary to likely alcoholic cardiomyopathy. Pro-BNP was checked and found to be less than 300; as a result no acute intervention was pursued and patient was recommended to cardiology follow up on discharge.     Patient was discharged to inpatient alcohol rehab at the [facility] on 2/24/17 stable and in good condition with recommendation to continue daily supplementation of thiamine, multivitamin and folic acid. HPI:  45 yo M with h/o EtOH use, chronic rash p/w worsening rash and EtOH withdrawal. Pt recently admitted with similar complaints and discharged with topical treatments but says he has not noticed any improvement. He thinks rash has gotten more diffuse including legs and back, continues to itch. Hands are painful. He denies any fevers or chills. He has no known allergies and has not changed his diet. He denies exposure to bed bugs. He has two roommates, neither have any rashes. He has not taken any new meds besides creams prescribed last admission. He has no GI or  symptoms. No SOB or congestion, no airway compromise.    Also of note, pt thinks he is withdrawing from alcohol. Feels tremulous, slight headache, photophobia. His last drink was 2 days ago. Says he usually drinks 12 beers per day. He has never had withdrawal seizures or DT. Last admission pt given ativan ativan taper, monitored on ciwa protocol. Also given high dose IV thiamine.       ED VS: 153/107  98  98.6  17  96% on RA  Pt was given valium 10mg IV and banana bag started       Hospital Course:  Patient in the ER noted to have likely alcohol withdrawal and also concern for Wernicke's encephalopathy. He received Valium but was noted to have CIWA score of 14 after dose and so was given one dose of Librium and then started on Ativan taper. Patient was also started on a banana bag as well as IV thiamine 500mg for three days. Patient was transferred to the medicine floors for management of alcohol withdrawal and pending dermatology consult for diffuse erythematous scaly plaques and papules.     On the medicine floors patient was continued on CIWA protocol and Ativan taper. Dermatology believed rash was most likely an irritant contact dermatitis secondary to various occupational exposures and recommended topical triamcinolone and topical clobetasol ointment which the patient improved on. On 2/8 patient became very agitated and was given 18g of Ativan over a 10 hour time period, after which point he was given phenobarbital 130mg x 9 later in the day owing to persistently elevated CIWA scores and agitation. He was at that point brought to the MICU where he was intubated and sedated for airway protection.    In the MICU patient became hypotensive secondary to sedation and required levophed for pressor support. He remained agitated, requiring Fentanyl. He was started on maintenance fluids for low urine output but they were discontinued after concern for fluid overload on evidence of left ventricular dysfunction on bedside echocardiogram. On 2/13 patient was titrated off fentanyl, versed and propofol and extubated to a face tent where he saturated well and was not found to be agitated. At that point patient's agitation was managed intermittently as needed with IV pushes of phenobarbital 130mg. Patient was determined to be medically stable for transfer back to the medicine floors.    On his second admission to the medicine floors patient was maintained on a high risk symptom-triggered CIWA protocol with phenobarbital and was continued on banana bag as at that point owing to significantly altered mental status patient remained NPO. He was obtunded, minimally responsive with intermittent agitation. He was also tachycardic with some bilateral hand tremors and requiring increasing frequency of doses of phenobarbital. Psychiatry re-evaluated the patient and determined that given the number of days since patient's admission and last alcohol intake it was very unlikely that patient was still withdrawing and believed that his agitation was most likely delirium secondary to ongoing Wernicke's encephalopathy. At this point patient's CIWA protocol was discontinued and he was given ziprasidone as needed for agitation. Because patient had been NPO for ~6 days with evidence of ketonuria the decision was made to place an NG tube for nutrition. With recommendations from nutritionist patient was started on feeds and metabolic panel was monitored every 12 hours to assess for signs of refeeding syndrome. Patient tolerated feeds without issue and two days after placement was noted to be alert, responsive and fully conversational without any agitation or confusion. At the patient's request his Moon was removed and he passed trial of void; his NG tube was also removed and he was started on a regular diet. He was at that point transitioned to oral forms of folic acid, multivitamin and thiamine which he received daily; banana bag was discontinued. Social work evaluated the patient and in accordance with both patient and family the decision was made to arrange discharge to inpatient alcohol rehab.    Patient's course was complicated only by an isolated fever. Urinalysis was clean, chest X ray was without infiltrate and blood cultures were obtained which were subsequently no growth. He was mostly without signs of symptoms of infection but did report a dry cough that had worsened some since removal of his NG tube. As a result, it was believed the most probably febrile source was a possible aspiration event leading to pneumonia and patient was given oral Levaquin given his clinical stability and allergy to Zosyn. He completed a five day course without any subsequent fevers and improvement in cough.    Of note, the left ventricular dysfunction noted on bedside ultrasound in the MICU was evaluated definitively with transthoracic echocardiogram once patient was alert and stabilized on the medical floor. Echo showed moderate global left ventricular systolic dysfunction, mild diastolic dysfunction (Stage I), normal right ventricular size and function and no significant valvular disease. Ejection fraction was 44%. These findings were believed to be secondary to likely alcoholic cardiomyopathy. Pro-BNP was checked and found to be less than 300; as a result no acute intervention was pursued and patient was recommended to cardiology follow up on discharge.     Patient was discharged to inpatient alcohol rehab at ______ on _____ stable and in good condition with recommendation to continue daily supplementation of thiamine, multivitamin and folic acid. HPI:  45 yo M with h/o EtOH use, chronic rash p/w worsening rash and EtOH withdrawal. Pt recently admitted with similar complaints and discharged with topical treatments but says he has not noticed any improvement. He thinks rash has gotten more diffuse including legs and back, continues to itch. Hands are painful. He denies any fevers or chills. He has no known allergies and has not changed his diet. He denies exposure to bed bugs. He has two roommates, neither have any rashes. He has not taken any new meds besides creams prescribed last admission. He has no GI or  symptoms. No SOB or congestion, no airway compromise.    Also of note, pt thinks he is withdrawing from alcohol. Feels tremulous, slight headache, photophobia. His last drink was 2 days ago. Says he usually drinks 12 beers per day. He has never had withdrawal seizures or DT. Last admission pt given ativan ativan taper, monitored on ciwa protocol.      ED VS: 153/107  98  98.6  17  96% on RA  Pt was given valium 10mg IV and banana bag started       Hospital Course:  Patient in the ER noted to have likely alcohol withdrawal and also concern for Wernicke's encephalopathy. He received Valium but was noted to have CIWA score of 14 after dose and so was given one dose of Librium and then started on Ativan taper. Patient was also started on a banana bag as well as IV thiamine 500mg for three days. Patient was transferred to the medicine floors for management of alcohol withdrawal and pending dermatology consult for diffuse erythematous scaly plaques and papules.     On the medicine floors patient was continued on CIWA protocol and Ativan taper. Dermatology believed rash was most likely an irritant contact dermatitis secondary to various occupational exposures and recommended topical triamcinolone and topical clobetasol ointment which the patient improved on. On 2/8 patient became very agitated and was given 18g of Ativan over a 10 hour time period, after which point he was given phenobarbital 130mg x 9 later in the day owing to persistently elevated CIWA scores and agitation. He was at that point brought to the MICU where he was intubated and sedated for airway protection.    In the MICU patient became hypotensive secondary to sedation and required levophed for pressor support. He remained agitated, requiring Fentanyl. He was started on maintenance fluids for low urine output but they were discontinued after concern for fluid overload on evidence of left ventricular dysfunction on bedside echocardiogram. On 2/13 patient was titrated off fentanyl, versed and propofol and extubated to a face tent where he saturated well and was not found to be agitated. At that point patient's agitation was managed intermittently as needed with IV pushes of phenobarbital 130mg. Patient was determined to be medically stable for transfer back to the medicine floors.    On his second admission to the medicine floors patient was maintained on a high risk symptom-triggered CIWA protocol with phenobarbital and was continued on banana bag as at that point owing to significantly altered mental status patient remained NPO. He was obtunded, minimally responsive with intermittent agitation. He was also tachycardic with some bilateral hand tremors and requiring increasing frequency of doses of phenobarbital. Psychiatry re-evaluated the patient and determined that given the number of days since patient's admission and last alcohol intake it was very unlikely that patient was still withdrawing and believed that his agitation was most likely delirium secondary to ongoing Wernicke's encephalopathy. At this point patient's CIWA protocol was discontinued and he was given ziprasidone as needed for agitation. Because patient had been NPO for ~6 days with evidence of ketonuria the decision was made to place an NG tube for nutrition. With recommendations from nutritionist patient was started on feeds and metabolic panel was monitored every 12 hours to assess for signs of refeeding syndrome. Patient tolerated feeds without issue and two days after placement was noted to be alert, responsive and fully conversational without any agitation or confusion. At the patient's request his Moon was removed and he passed trial of void; his NG tube was also removed and he was started on a regular diet. He was at that point transitioned to oral forms of folic acid, multivitamin and thiamine which he received daily; banana bag was discontinued. Social work evaluated the patient and in accordance with both patient and family the decision was made to arrange discharge to inpatient alcohol rehab.    Patient's course was complicated only by an isolated fever. Urinalysis was clean, chest X ray was without infiltrate and blood cultures were obtained which were subsequently no growth. He was mostly without signs of symptoms of infection but did report a dry cough that had worsened some since removal of his NG tube. As a result, it was believed the most probably febrile source was a possible aspiration event leading to pneumonia and patient was given oral Levaquin given his clinical stability and allergy to Zosyn. He completed a five day course without any subsequent fevers and improvement in cough.    Of note, the left ventricular dysfunction noted on bedside ultrasound in the MICU was evaluated definitively with transthoracic echocardiogram once patient was alert and stabilized on the medical floor. Echo showed moderate global left ventricular systolic dysfunction, mild diastolic dysfunction (Stage I), normal right ventricular size and function and no significant valvular disease. Ejection fraction was 44%. These findings were believed to be secondary to likely alcoholic cardiomyopathy. Pro-BNP was checked and found to be less than 300; as a result no acute intervention was pursued and patient was recommended to cardiology follow up on discharge.     Patient was discharged to inpatient alcohol rehab at stable and in good condition with recommendation to continue daily supplementation of thiamine, multivitamin and folic acid.

## 2017-02-22 NOTE — DISCHARGE NOTE ADULT - CARE PROVIDERS DIRECT ADDRESSES
[email or web address]

## 2017-02-22 NOTE — DISCHARGE NOTE ADULT - COMMUNITY RESOURCES
[facility], Inpatient Rehab  [address] [place] [id number]  [phone]

## 2017-02-22 NOTE — DISCHARGE NOTE ADULT - PATIENT PORTAL LINK FT
“You can access the [facility] Patient Portal, offered by [facility], by registering with the following website: [email or web address]”

## 2017-02-22 NOTE — DISCHARGE NOTE ADULT - MEDICATION SUMMARY - MEDICATIONS TO STOP TAKING
I will STOP taking the medications listed below when I get home from the hospital:    mirtazapine 15 mg oral tablet  -- 1 tab(s) by mouth once a day (at bedtime)    diphenhydrAMINE 25 mg oral capsule  -- 1 cap(s) by mouth every 6 hours, As needed, Rash and/or Itching    mupirocin 2% topical ointment  -- 1 application on skin 2 times a day    clobetasol 0.05% topical ointment  -- 1 application on skin 2 times a day Stop after 13 days

## 2017-02-22 NOTE — DISCHARGE NOTE ADULT - PLAN OF CARE
Cessation of drinking, prevention of subsequent episodes of withdrawal You were admitted to the hospital and at the time of presentation were already exhibiting signs of alcohol withdrawal having had your last drink two days prior. You were put on a protocol to monitor and manage withdrawal called "ALYX," first with Ativan as needed. However, your requirements for Ativan were so frequency that you were transitioned to phenobarbital instead. Despite this medication, you were noted to be very agitated, your heart was very fast and your mental status was such that there was concern about your ability to protect your airway, so the decision was made to intubate you and bring you to the ICU. In the ICU your symptoms of withdrawal continued to be managed until you were able to be safely extubated and transferred to the medicine floors. When you returned to the floor you were continued for a few more days and were still agitated, though given how long you'd been in the hospital it was believed you were no longer withdrawing actively from alcohol and rather your agitation was the result of Wernicke's encephalopathy (see below). At this point the protocol was discontinued. Thereafter, you become alert, oriented and responsive. The social worked spoke with you and a plan was made to discharge you to inpatient alcohol rehab to work on your recovery and cessation of alcohol to prevent any complications such as this in the future. Compliance with daily vitamin supplementation, prevention of future episodes Shortly after entering the hospital you were noted to be highly agitated and also confused, disoriented and eventually to the point of being obtunded. Though some of these symptoms can be attributed to alcohol withdrawal, the degree of your mental status impairment was significant enough that the psychiatry service was concerned you had something called Wernicke's encephalopathy. This is a type of alteration in mental status owing to a deficiency of the vitamin thiamine (vitamin B1), which people who abuse alcohol are likely to develop. You were given thiamine through an intravenous line for three days and then in a bag continuously that also contained folic acid and a multivitamin to supplement other nutrients often lacking in patient with alcohol abuse. Your mental status was impaired enough that you were unable to swallow effectively and there was concern about the risk of aspirating into your lungs, so you were not allowed to eat anything but mouth and were hydrated with fluids in the IV alone. After a period of time owing to concerns for your nutrition a nasogastric feeding tube was placed which you received nutrients through for a few days until you awoke and were able to eat on your own. You will continue to take daily oral supplements of thiamine, folic acid and a multivitamin in rehab and should continue doing so daily to prevent another episode like this happening again, though the best way to prevent it is to abstain from alcohol entirely. Cessation of alcohol, recovery with support of rehab You are being discharged to an inpatient rehabilitation facility for people who struggle with alcohol abuse. There, you will received the support you need to help cease drinking entirely and prevent the many complications that can arise from alcohol abuse. Symptomatic relief, follow up with dermatology When you were here in the hospital the dermatology team saw you to evaluate the rash on your body and determined the most likely cause is something called irritant contact dermatitis, a type of irritation from various chemicals and solvents that you may be exposed to at work. It is not possible at this time to say what exposure particularly is the cause, but they would like you to follow up with them within the next month for follow up. The dermatologist who saw you is named Dr. Veronica Oconnell and her contact information is provided in this paperwork. In the meantime, please continue to use the two topical medications they prescribed you and which helped damage your rash in the hospital. They are triamcinolone and clobetasol. Clobetasol is a more potent steroid that can be used on thicker skin such as that found on the palms and soles of the feet, as well as any red patches of skin that have a thick scale or a lot of dryness. The remaining parts of the body should be treated with triamcinolone. You should apply both twice daily as needed when your rash flares and then, if possible, discontinue use for a week to give your skin a break before resuming again. Please also use emollients such as Aquaphor (available over the counter) or vaseline as often as needed to help with the dryness and burning. Follow up with cardiomyopathy An ultrasound of your heart in the ICU showed that your heart was not pumping as effectively as it should be. When you were more alert and stable a definitive view of your heart called an echocardiogram was performed which showed that you have some dysfunction in the ability of your heart to pump as strongly as it should be with each beat, which is likely the effect of alcohol and something called "alcoholic cardiomyopathy." Based on your lack of symptoms and a laboratory value called a pro-BNP which was not very elevated, there was no acute intervention needed and no medications that needed to be started. However, it is strongly recommended that you follow up with a cardiologist when you are able for further assessment. Completion of antibiotics You had a fever later in your hospital course but were without signs of symptoms of infection. Blood cultures were negative for any bacteria. Because you had a cough and were at risk for an aspiration pneumonia due to your mental status earlier in hospitalization and the feeding tube that was used for a few days, you were put on an antibiotic called Levaquin. You need to take this for three more days through 2/27 to complete the full course. When you were here in the hospital the dermatology team saw you to evaluate the rash on your body and determined the most likely cause is something called irritant contact dermatitis, a type of irritation from various chemicals and solvents that you may be exposed to at work. It is not possible at this time to say what exposure particularly is the cause, but they would like you to follow up with them within the next month for follow up. The dermatologist who saw you is named Dr. Veronica Oconnell and her contact information is provided in this paperwork. In the meantime, please continue to use the two topical medications they prescribed you and which helped damage your rash in the hospital. They are triamcinolone and clobetasol. We discontinued Clobetasol as it is a potent steroid and can cause thinning of the skin. Please also use emollients such as Aquaphor (available over the counter) or vaseline as often as needed to help with the dryness and burning. You had a fever later in your hospital course but were without signs of symptoms of infection. Blood cultures were negative for any bacteria. Because you had a cough and were at risk for an aspiration pneumonia due to your mental status earlier in hospitalization and the feeding tube that was used for a few days, you were put on an antibiotic called Levaquin and completed a full course.

## 2017-02-22 NOTE — DISCHARGE NOTE ADULT - MEDICATION SUMMARY - MEDICATIONS TO TAKE
I will START or STAY ON the medications listed below when I get home from the hospital:    petrolatum topical ointment  -- 1 application on skin every 4 hours, As needed, xerosis  -- Indication: For Rash    triamcinolone 0.1% topical ointment  -- 1 application on skin 2 times a day  -- Indication: For Rash    lactobacillus acidophilus oral capsule  -- 1 tab(s) by mouth 2 times a day  -- Indication: For supplement    Multiple Vitamins oral tablet  -- 1 tab(s) by mouth once a day  -- Indication: For supplement    folic acid 1 mg oral tablet  -- 1 tab(s) by mouth once a day  -- Indication: For supplement    thiamine 100 mg oral tablet  -- 1 tab(s) by mouth once a day  -- Indication: For supplement

## 2017-02-22 NOTE — DISCHARGE NOTE ADULT - CARE PROVIDER_API CALL
Veronica Oconnell), Dermatology  [address] [place] [address]  Phone: [phone]  Fax: [phone]    Haja Fernandez (MD), Cardiovascular Disease; Internal Medicine; Interventional Cardiology  [address] [facility] [address]  [place] [address]  Phone: [phone]  Fax: [phone]

## 2017-02-23 LAB
APPEARANCE UR: CLEAR — SIGNIFICANT CHANGE UP
B PERT DNA SPEC QL NAA+PROBE: SIGNIFICANT CHANGE UP
BILIRUB UR-MCNC: NEGATIVE — SIGNIFICANT CHANGE UP
BLOOD UR QL VISUAL: HIGH
BUN SERPL-MCNC: 15 MG/DL — SIGNIFICANT CHANGE UP (ref 7–23)
C PNEUM DNA SPEC QL NAA+PROBE: NOT DETECTED — SIGNIFICANT CHANGE UP
CALCIUM SERPL-MCNC: 9.5 MG/DL — SIGNIFICANT CHANGE UP (ref 8.4–10.5)
CHLORIDE SERPL-SCNC: 101 MMOL/L — SIGNIFICANT CHANGE UP (ref 98–107)
CO2 SERPL-SCNC: 21 MMOL/L — LOW (ref 22–31)
COLOR SPEC: SIGNIFICANT CHANGE UP
CREAT SERPL-MCNC: 0.82 MG/DL — SIGNIFICANT CHANGE UP (ref 0.5–1.3)
FLUAV H1 2009 PAND RNA SPEC QL NAA+PROBE: NOT DETECTED — SIGNIFICANT CHANGE UP
FLUAV H1 RNA SPEC QL NAA+PROBE: NOT DETECTED — SIGNIFICANT CHANGE UP
FLUAV H3 RNA SPEC QL NAA+PROBE: NOT DETECTED — SIGNIFICANT CHANGE UP
FLUAV SUBTYP SPEC NAA+PROBE: SIGNIFICANT CHANGE UP
FLUBV RNA SPEC QL NAA+PROBE: NOT DETECTED — SIGNIFICANT CHANGE UP
GLUCOSE SERPL-MCNC: 97 MG/DL — SIGNIFICANT CHANGE UP (ref 70–99)
GLUCOSE UR-MCNC: NEGATIVE — SIGNIFICANT CHANGE UP
HADV DNA SPEC QL NAA+PROBE: NOT DETECTED — SIGNIFICANT CHANGE UP
HCOV 229E RNA SPEC QL NAA+PROBE: NOT DETECTED — SIGNIFICANT CHANGE UP
HCOV HKU1 RNA SPEC QL NAA+PROBE: NOT DETECTED — SIGNIFICANT CHANGE UP
HCOV NL63 RNA SPEC QL NAA+PROBE: NOT DETECTED — SIGNIFICANT CHANGE UP
HCOV OC43 RNA SPEC QL NAA+PROBE: NOT DETECTED — SIGNIFICANT CHANGE UP
HCT VFR BLD CALC: 40.2 % — SIGNIFICANT CHANGE UP (ref 39–50)
HGB BLD-MCNC: 13.6 G/DL — SIGNIFICANT CHANGE UP (ref 13–17)
HMPV RNA SPEC QL NAA+PROBE: NOT DETECTED — SIGNIFICANT CHANGE UP
HPIV1 RNA SPEC QL NAA+PROBE: NOT DETECTED — SIGNIFICANT CHANGE UP
HPIV2 RNA SPEC QL NAA+PROBE: NOT DETECTED — SIGNIFICANT CHANGE UP
HPIV3 RNA SPEC QL NAA+PROBE: NOT DETECTED — SIGNIFICANT CHANGE UP
HPIV4 RNA SPEC QL NAA+PROBE: NOT DETECTED — SIGNIFICANT CHANGE UP
KETONES UR-MCNC: NEGATIVE — SIGNIFICANT CHANGE UP
LEUKOCYTE ESTERASE UR-ACNC: SIGNIFICANT CHANGE UP
M PNEUMO DNA SPEC QL NAA+PROBE: NOT DETECTED — SIGNIFICANT CHANGE UP
M TB TUBERC IFN-G BLD QL: 0 IU/ML — SIGNIFICANT CHANGE UP
M TB TUBERC IFN-G BLD QL: 0.05 IU/ML — SIGNIFICANT CHANGE UP
M TB TUBERC IFN-G BLD QL: NEGATIVE — SIGNIFICANT CHANGE UP
MAGNESIUM SERPL-MCNC: 1.9 MG/DL — SIGNIFICANT CHANGE UP (ref 1.6–2.6)
MCHC RBC-ENTMCNC: 32.5 PG — SIGNIFICANT CHANGE UP (ref 27–34)
MCHC RBC-ENTMCNC: 33.8 % — SIGNIFICANT CHANGE UP (ref 32–36)
MCV RBC AUTO: 95.9 FL — SIGNIFICANT CHANGE UP (ref 80–100)
MITOGEN IGNF BCKGRD COR BLD-ACNC: 2.13 IU/ML — SIGNIFICANT CHANGE UP
MUCOUS THREADS # UR AUTO: SIGNIFICANT CHANGE UP
NITRITE UR-MCNC: NEGATIVE — SIGNIFICANT CHANGE UP
PH UR: 7 — SIGNIFICANT CHANGE UP (ref 4.6–8)
PHOSPHATE SERPL-MCNC: 4.1 MG/DL — SIGNIFICANT CHANGE UP (ref 2.5–4.5)
PLATELET # BLD AUTO: 441 K/UL — HIGH (ref 150–400)
PMV BLD: 10.7 FL — SIGNIFICANT CHANGE UP (ref 7–13)
POTASSIUM SERPL-MCNC: 4.6 MMOL/L — SIGNIFICANT CHANGE UP (ref 3.5–5.3)
POTASSIUM SERPL-SCNC: 4.6 MMOL/L — SIGNIFICANT CHANGE UP (ref 3.5–5.3)
PROT UR-MCNC: 30 — SIGNIFICANT CHANGE UP
RBC # BLD: 4.19 M/UL — LOW (ref 4.2–5.8)
RBC # FLD: 13.4 % — SIGNIFICANT CHANGE UP (ref 10.3–14.5)
RBC CASTS # UR COMP ASSIST: SIGNIFICANT CHANGE UP (ref 0–?)
RSV RNA SPEC QL NAA+PROBE: NOT DETECTED — SIGNIFICANT CHANGE UP
RV+EV RNA SPEC QL NAA+PROBE: NOT DETECTED — SIGNIFICANT CHANGE UP
SODIUM SERPL-SCNC: 138 MMOL/L — SIGNIFICANT CHANGE UP (ref 135–145)
SP GR SPEC: 1.02 — SIGNIFICANT CHANGE UP (ref 1–1.03)
UROBILINOGEN FLD QL: NORMAL E.U. — SIGNIFICANT CHANGE UP (ref 0.1–0.2)
WBC # BLD: 15.76 K/UL — HIGH (ref 3.8–10.5)
WBC # FLD AUTO: 15.76 K/UL — HIGH (ref 3.8–10.5)
WBC UR QL: SIGNIFICANT CHANGE UP (ref 0–?)

## 2017-02-23 PROCEDURE — 71010: CPT | Mod: 26

## 2017-02-23 PROCEDURE — 99233 SBSQ HOSP IP/OBS HIGH 50: CPT | Mod: GC

## 2017-02-23 RX ORDER — PIPERACILLIN AND TAZOBACTAM 4; .5 G/20ML; G/20ML
3.38 INJECTION, POWDER, LYOPHILIZED, FOR SOLUTION INTRAVENOUS ONCE
Qty: 0 | Refills: 0 | Status: DISCONTINUED | OUTPATIENT
Start: 2017-02-23 | End: 2017-02-23

## 2017-02-23 RX ORDER — PIPERACILLIN AND TAZOBACTAM 4; .5 G/20ML; G/20ML
3.38 INJECTION, POWDER, LYOPHILIZED, FOR SOLUTION INTRAVENOUS EVERY 8 HOURS
Qty: 0 | Refills: 0 | Status: DISCONTINUED | OUTPATIENT
Start: 2017-02-23 | End: 2017-02-23

## 2017-02-23 RX ORDER — IBUPROFEN 200 MG
600 TABLET ORAL DAILY
Qty: 0 | Refills: 0 | Status: COMPLETED | OUTPATIENT
Start: 2017-02-23 | End: 2017-02-28

## 2017-02-23 RX ORDER — ACETAMINOPHEN 500 MG
650 TABLET ORAL ONCE
Qty: 0 | Refills: 0 | Status: DISCONTINUED | OUTPATIENT
Start: 2017-02-23 | End: 2017-02-23

## 2017-02-23 RX ORDER — INSULIN LISPRO 100/ML
VIAL (ML) SUBCUTANEOUS AT BEDTIME
Qty: 0 | Refills: 0 | Status: DISCONTINUED | OUTPATIENT
Start: 2017-02-23 | End: 2017-02-23

## 2017-02-23 RX ADMIN — Medication 600 MILLIGRAM(S): at 18:08

## 2017-02-23 RX ADMIN — Medication 1 APPLICATION(S): at 12:03

## 2017-02-23 RX ADMIN — Medication 1 APPLICATION(S): at 06:48

## 2017-02-23 RX ADMIN — Medication 1 TABLET(S): at 12:03

## 2017-02-23 RX ADMIN — Medication 1 APPLICATION(S): at 18:08

## 2017-02-23 RX ADMIN — Medication 1 MILLIGRAM(S): at 12:03

## 2017-02-23 RX ADMIN — Medication 600 MILLIGRAM(S): at 19:08

## 2017-02-23 RX ADMIN — Medication 1 APPLICATION(S): at 00:00

## 2017-02-23 RX ADMIN — Medication 1 APPLICATION(S): at 23:52

## 2017-02-24 LAB
BACTERIA UR CULT: SIGNIFICANT CHANGE UP
SPECIMEN SOURCE: SIGNIFICANT CHANGE UP

## 2017-02-24 PROCEDURE — 99232 SBSQ HOSP IP/OBS MODERATE 35: CPT | Mod: GC

## 2017-02-24 RX ORDER — PETROLATUM,WHITE
1 JELLY (GRAM) TOPICAL
Qty: 0 | Refills: 0 | COMMUNITY
Start: 2017-02-24

## 2017-02-24 RX ORDER — CIPROFLOXACIN LACTATE 400MG/40ML
1 VIAL (ML) INTRAVENOUS
Qty: 0 | Refills: 0 | COMMUNITY
Start: 2017-02-24

## 2017-02-24 RX ADMIN — Medication 1 MILLIGRAM(S): at 11:11

## 2017-02-24 RX ADMIN — Medication 1 APPLICATION(S): at 18:02

## 2017-02-24 RX ADMIN — Medication 1 APPLICATION(S): at 11:12

## 2017-02-24 RX ADMIN — Medication 1 APPLICATION(S): at 05:31

## 2017-02-24 RX ADMIN — Medication 1 TABLET(S): at 11:11

## 2017-02-24 RX ADMIN — Medication 600 MILLIGRAM(S): at 11:11

## 2017-02-24 RX ADMIN — Medication 1 APPLICATION(S): at 22:50

## 2017-02-24 NOTE — PROVIDER CONTACT NOTE (OTHER) - RECOMMENDATIONS
Security called, Haldol 3mg IM given. MICU consult?
administer 4 oz. apple juice via NGT
blood cultures and Tylenol
pulse ox q1 hour per order set and continue to monitor
Encourage pt.  to comply with medication regime.  benefits and risks of refusing medication discussed with pt.
MD notified and aware for further interventions.
MD to be notified
Reorient patient to reality.
administer PRN dose ativan for agitation
administer standing dose ativan 2.5 milligrams IVP
anticipating blood culture and tylenol. Will continue to monitor
come to assess patient. MICU consult. initiate CIWA assessment hourly. administer 2mg ativan IVP PRN as ordered
re- educate pt on the benefits and consequences of refusing Lovenox

## 2017-02-24 NOTE — PROVIDER CONTACT NOTE (OTHER) - BACKGROUND
MICU transfer for ETOH abuse, hx chronic rash and diverticulosis
Pt. has encephalopathy, no IV access at present, Pt. has NGT
ETOH
ETOH abuse    dermatitis
ETOH abuse  Dermatitis
Patient admitted for rash; high risk CIWA.
Patient was admitted for rash with h/o of ETOH use
pt CIWA score between 9-19 throughout night. A&Ox2.
pt CIWA score previously 6
pt admitted for high risk CIWA protocol
pt admitted for ETOH abuse hx of etoh abuse and diverticulitis
MICU transfer where he was intubated and extubated admitted for ETOH withdrawal and generalized rash
admitted from MICU where he was intubated and extubated for ETOH withdrawal

## 2017-02-24 NOTE — PROVIDER CONTACT NOTE (OTHER) - ASSESSMENT
Pt refusing am labs -states he has bad veins, wants to wait for Dr to attempt
Pt. mental status is at baseline, unable to administer PO
agitated, restless, pulling on NG tube and attempting to climb out of bed.  current CIWA score 9, phenobarbitol administered
constantly thrashing about; attempting to climb out of bed; attempting to kick, bite and punch staff; pulled out NG tube; uncooperative with obtaining blood cultures for temperature necessitating 4 staff members to hold patient down.  S/p phenobarb IV push x3
lethargic, opens eyes but unable to make eye contact and does not respond verbally, restless in bed
Patient agitated and restless.
Patient's CIWA score is 12 - patient seems moderately anxious, and agitated. Patient appears to be trying to get out of bed.
V/S hourly rounding enhance supervision
V/V
V/s  Enhance supervision
pt A&Ox2. VSS. pt is agitated and pulling at lines.
pt A&Ox3. pt experiences moderate visible tremors, mildly anxious and cannot do serial additions.
pt is A&Ox1 to self. Moderate tremors. moderately anxious. Pacing back and forth.

## 2017-02-24 NOTE — PROVIDER CONTACT NOTE (OTHER) - SITUATION
CIWA q1h, current score is 16.  Patient pulled out NG tube
Patient BP: 147/108, HR: 116
Patient's CIWA score is 12 - patient seems moderately anxious, and agitated. Patient appears to be trying to get out of bed.
Pt refusing am labs -states he has bad veins wants to wait for Dr to attempt
Pt. refused Lovenox SQ
Pt. refused SQ Lovenox
Temp 100.6
patient temperature 101.1
pt CIWA score 19
pt refusing NS continuous fluid
pt triggered 3 symptoms on CIWA assessment
Low FS
mRASS score -3, notifying per parameters

## 2017-02-24 NOTE — PROVIDER CONTACT NOTE (OTHER) - REASON
BP: 147/108, HR: 116
CIWA Score of 12
FS 70, repeat FS 61
Pt. refused Lovenox SQ
Pt. refused SQ Lovenox
Temp. 100.6
pt CIWA score 19
pt refusing NS fluid
pt triggered 3 symptoms on CIWA assessment
Pt refusing am labs -states he has bad veins wants to wait for Dr to attempt
CIWA score 16, pulled out NG tube
febrile 101.1
mRASS score -3

## 2017-02-24 NOTE — PROVIDER CONTACT NOTE (OTHER) - DATE AND TIME:
15-Feb-2017 21:22
16-Feb-2017 22:55
16-Feb-2017 23:40
24-Feb-2017 07:05
15-Feb-2017 10:45
10-Feb-2017 20:58
11-Feb-2017 03:21
11-Feb-2017 06:00
17-Feb-2017
18-Feb-2017 09:45
21-Feb-2017 06:34
22-Feb-2017 06:30
22-Feb-2017 22:15

## 2017-02-24 NOTE — PROVIDER CONTACT NOTE (OTHER) - NAME OF MD/NP/PA/DO NOTIFIED:
Dr. Alfonso
Dr. Bull
Dr. Dang
MD Bull
Scott Dumont
Scott bruno
Tim
Dr Genao
MD BOWDEN

## 2017-02-25 PROCEDURE — 99232 SBSQ HOSP IP/OBS MODERATE 35: CPT | Mod: GC

## 2017-02-25 RX ADMIN — Medication 1 APPLICATION(S): at 06:32

## 2017-02-25 RX ADMIN — Medication 1 APPLICATION(S): at 23:45

## 2017-02-25 RX ADMIN — Medication 1 APPLICATION(S): at 12:06

## 2017-02-25 RX ADMIN — Medication 1 TABLET(S): at 12:05

## 2017-02-25 RX ADMIN — Medication 600 MILLIGRAM(S): at 12:06

## 2017-02-25 RX ADMIN — Medication 1 APPLICATION(S): at 17:57

## 2017-02-25 RX ADMIN — Medication 1 MILLIGRAM(S): at 12:05

## 2017-02-26 PROCEDURE — 99232 SBSQ HOSP IP/OBS MODERATE 35: CPT | Mod: GC

## 2017-02-26 RX ADMIN — Medication 1 APPLICATION(S): at 12:01

## 2017-02-26 RX ADMIN — Medication 1 APPLICATION(S): at 17:29

## 2017-02-26 RX ADMIN — Medication 1 MILLIGRAM(S): at 12:00

## 2017-02-26 RX ADMIN — Medication 600 MILLIGRAM(S): at 12:01

## 2017-02-26 RX ADMIN — Medication 1 APPLICATION(S): at 06:28

## 2017-02-26 RX ADMIN — Medication 1 TABLET(S): at 12:01

## 2017-02-27 PROCEDURE — 99232 SBSQ HOSP IP/OBS MODERATE 35: CPT | Mod: GC

## 2017-02-27 RX ADMIN — Medication 1 APPLICATION(S): at 23:42

## 2017-02-27 RX ADMIN — Medication 1 MILLIGRAM(S): at 11:16

## 2017-02-27 RX ADMIN — Medication 1 TABLET(S): at 11:17

## 2017-02-27 RX ADMIN — Medication 1 APPLICATION(S): at 00:19

## 2017-02-27 RX ADMIN — Medication 1 APPLICATION(S): at 11:17

## 2017-02-27 RX ADMIN — Medication 1 APPLICATION(S): at 07:12

## 2017-02-27 RX ADMIN — Medication 600 MILLIGRAM(S): at 11:16

## 2017-02-28 LAB
BACTERIA BLD CULT: SIGNIFICANT CHANGE UP
BACTERIA BLD CULT: SIGNIFICANT CHANGE UP

## 2017-02-28 PROCEDURE — 99232 SBSQ HOSP IP/OBS MODERATE 35: CPT | Mod: GC

## 2017-02-28 RX ADMIN — Medication 1 APPLICATION(S): at 12:58

## 2017-02-28 RX ADMIN — Medication 600 MILLIGRAM(S): at 12:57

## 2017-02-28 RX ADMIN — Medication 1 TABLET(S): at 12:58

## 2017-02-28 RX ADMIN — Medication 1 MILLIGRAM(S): at 12:57

## 2017-02-28 RX ADMIN — Medication 1 APPLICATION(S): at 23:54

## 2017-03-01 ENCOUNTER — OUTPATIENT (OUTPATIENT)
Dept: OUTPATIENT SERVICES | Facility: HOSPITAL | Age: 47
LOS: 1 days | End: 2017-03-01
Payer: MEDICAID

## 2017-03-01 VITALS
HEART RATE: 71 BPM | RESPIRATION RATE: 100 BRPM | SYSTOLIC BLOOD PRESSURE: 107 MMHG | TEMPERATURE: 98 F | OXYGEN SATURATION: 98 % | DIASTOLIC BLOOD PRESSURE: 75 MMHG

## 2017-03-01 DIAGNOSIS — K57.92 DIVERTICULITIS OF INTESTINE, PART UNSPECIFIED, WITHOUT PERFORATION OR ABSCESS WITHOUT BLEEDING: Chronic | ICD-10-CM

## 2017-03-01 DIAGNOSIS — Z98.89 OTHER SPECIFIED POSTPROCEDURAL STATES: Chronic | ICD-10-CM

## 2017-03-01 PROCEDURE — 99239 HOSP IP/OBS DSCHRG MGMT >30: CPT

## 2017-03-13 DIAGNOSIS — R69 ILLNESS, UNSPECIFIED: ICD-10-CM

## 2017-08-24 ENCOUNTER — EMERGENCY (EMERGENCY)
Facility: HOSPITAL | Age: 47
LOS: 1 days | Discharge: HOME | End: 2017-08-24
Attending: EMERGENCY MEDICINE | Admitting: EMERGENCY MEDICINE
Payer: MEDICAID

## 2017-08-24 ENCOUNTER — OUTPATIENT (OUTPATIENT)
Dept: OUTPATIENT SERVICES | Facility: HOSPITAL | Age: 47
LOS: 1 days | Discharge: STILL ADMITTED OR ADMINISTRATIVE | End: 2017-08-24

## 2017-08-24 VITALS
TEMPERATURE: 99 F | SYSTOLIC BLOOD PRESSURE: 136 MMHG | RESPIRATION RATE: 16 BRPM | OXYGEN SATURATION: 100 % | DIASTOLIC BLOOD PRESSURE: 85 MMHG | HEART RATE: 87 BPM

## 2017-08-24 VITALS
TEMPERATURE: 98 F | OXYGEN SATURATION: 97 % | RESPIRATION RATE: 18 BRPM | SYSTOLIC BLOOD PRESSURE: 141 MMHG | HEART RATE: 89 BPM | DIASTOLIC BLOOD PRESSURE: 80 MMHG

## 2017-08-24 DIAGNOSIS — Z98.89 OTHER SPECIFIED POSTPROCEDURAL STATES: Chronic | ICD-10-CM

## 2017-08-24 DIAGNOSIS — K57.92 DIVERTICULITIS OF INTESTINE, PART UNSPECIFIED, WITHOUT PERFORATION OR ABSCESS WITHOUT BLEEDING: Chronic | ICD-10-CM

## 2017-08-24 LAB
ALBUMIN SERPL ELPH-MCNC: 4.2 G/DL — SIGNIFICANT CHANGE UP (ref 3.3–5)
ALP SERPL-CCNC: 105 U/L — SIGNIFICANT CHANGE UP (ref 40–120)
ALT FLD-CCNC: 27 U/L — SIGNIFICANT CHANGE UP (ref 4–41)
AST SERPL-CCNC: 57 U/L — HIGH (ref 4–40)
BILIRUB SERPL-MCNC: 0.2 MG/DL — SIGNIFICANT CHANGE UP (ref 0.2–1.2)
BUN SERPL-MCNC: 4 MG/DL — LOW (ref 7–23)
CALCIUM SERPL-MCNC: 8.7 MG/DL — SIGNIFICANT CHANGE UP (ref 8.4–10.5)
CHLORIDE SERPL-SCNC: 98 MMOL/L — SIGNIFICANT CHANGE UP (ref 98–107)
CO2 SERPL-SCNC: 25 MMOL/L — SIGNIFICANT CHANGE UP (ref 22–31)
CREAT SERPL-MCNC: 0.8 MG/DL — SIGNIFICANT CHANGE UP (ref 0.5–1.3)
GLUCOSE SERPL-MCNC: 84 MG/DL — SIGNIFICANT CHANGE UP (ref 70–99)
HCT VFR BLD CALC: 42.5 % — SIGNIFICANT CHANGE UP (ref 39–50)
HGB BLD-MCNC: 14.5 G/DL — SIGNIFICANT CHANGE UP (ref 13–17)
MCHC RBC-ENTMCNC: 30.3 PG — SIGNIFICANT CHANGE UP (ref 27–34)
MCHC RBC-ENTMCNC: 34.1 % — SIGNIFICANT CHANGE UP (ref 32–36)
MCV RBC AUTO: 88.9 FL — SIGNIFICANT CHANGE UP (ref 80–100)
NRBC # FLD: 0 — SIGNIFICANT CHANGE UP
PLATELET # BLD AUTO: 197 K/UL — SIGNIFICANT CHANGE UP (ref 150–400)
PMV BLD: 9.5 FL — SIGNIFICANT CHANGE UP (ref 7–13)
POTASSIUM SERPL-MCNC: 4.2 MMOL/L — SIGNIFICANT CHANGE UP (ref 3.5–5.3)
POTASSIUM SERPL-SCNC: 4.2 MMOL/L — SIGNIFICANT CHANGE UP (ref 3.5–5.3)
PROT SERPL-MCNC: 7.7 G/DL — SIGNIFICANT CHANGE UP (ref 6–8.3)
RBC # BLD: 4.78 M/UL — SIGNIFICANT CHANGE UP (ref 4.2–5.8)
RBC # FLD: 16.2 % — HIGH (ref 10.3–14.5)
SODIUM SERPL-SCNC: 141 MMOL/L — SIGNIFICANT CHANGE UP (ref 135–145)
WBC # BLD: 4.68 K/UL — SIGNIFICANT CHANGE UP (ref 3.8–10.5)
WBC # FLD AUTO: 4.68 K/UL — SIGNIFICANT CHANGE UP (ref 3.8–10.5)

## 2017-08-24 PROCEDURE — 90792 PSYCH DIAG EVAL W/MED SRVCS: CPT

## 2017-08-24 PROCEDURE — 99285 EMERGENCY DEPT VISIT HI MDM: CPT

## 2017-08-24 RX ORDER — METOCLOPRAMIDE HCL 10 MG
10 TABLET ORAL ONCE
Qty: 0 | Refills: 0 | Status: COMPLETED | OUTPATIENT
Start: 2017-08-24 | End: 2017-08-24

## 2017-08-24 RX ORDER — SODIUM CHLORIDE 9 MG/ML
1000 INJECTION INTRAMUSCULAR; INTRAVENOUS; SUBCUTANEOUS ONCE
Qty: 0 | Refills: 0 | Status: COMPLETED | OUTPATIENT
Start: 2017-08-24 | End: 2017-08-24

## 2017-08-24 RX ORDER — ONDANSETRON 8 MG/1
4 TABLET, FILM COATED ORAL ONCE
Qty: 0 | Refills: 0 | Status: COMPLETED | OUTPATIENT
Start: 2017-08-24 | End: 2017-08-24

## 2017-08-24 RX ORDER — DIPHENHYDRAMINE HCL 50 MG
25 CAPSULE ORAL EVERY 4 HOURS
Qty: 0 | Refills: 0 | Status: DISCONTINUED | OUTPATIENT
Start: 2017-08-24 | End: 2017-08-28

## 2017-08-24 RX ADMIN — ONDANSETRON 4 MILLIGRAM(S): 8 TABLET, FILM COATED ORAL at 14:18

## 2017-08-24 RX ADMIN — Medication 10 MILLIGRAM(S): at 15:08

## 2017-08-24 RX ADMIN — Medication 25 MILLIGRAM(S): at 15:08

## 2017-08-24 RX ADMIN — SODIUM CHLORIDE 1000 MILLILITER(S): 9 INJECTION INTRAMUSCULAR; INTRAVENOUS; SUBCUTANEOUS at 14:18

## 2017-08-24 RX ADMIN — Medication 1 MILLIGRAM(S): at 15:55

## 2017-08-24 NOTE — ED PROVIDER NOTE - PROGRESS NOTE DETAILS
Patient had allergic rxn to zofran. Gave reglan and ns bolus for management. Still having some "butterflies in stomach" will give 1mg ativan. Going to send patient to crisis management for psych eval. Luc att: Patient had allergic reaction to zofran, improving with benadryl. Patient states no improvement in nausea and butterflies with reglan. Patient writen for ativan 1mg ivp. Psych unable to eval patient for generalized anxiety w/o si/hi/ah symptoms due to high volumes. Call placed to Crisis Hotline, can eval patient within 30 minutes. Patient and patient's wife amenable to walking to [facility] outpatient immediately upon discharge. Time of discharge patient aaox3, calm, cooperative, steady unassisted gait, and denies risk of harm to self or others. Dc to outpatient care.

## 2017-08-24 NOTE — ED ADULT TRIAGE NOTE - CHIEF COMPLAINT QUOTE
Pt c/o of feeling depressed and anxious denies suicidal ideation not hearing any voices. pt states he was drinking today had eight beers. Pt also c.o of having a rash on his feet. Pt just got out of rehab one month ago and has not taken his meds.

## 2017-08-24 NOTE — ED PROVIDER NOTE - OBJECTIVE STATEMENT
Patient is a 46 yo M with PMHx of alcohol dependence presents today for 1 mo of anxiety. Patient was released from rehab 1 month ago where he took prozac for management of anxiety/depression. However, when he left rehab, he was unable to make an appointment with psych and has been off for 1 month. Over the past two weeks, he has had increased anxiety with palpitations, nausea, vomiting. Denies SI/HI. This morning, to calm his nerves, he drank 5 beers, then took a nap before coming to the ED. Last drink was @ 930am. Patient also had b/l venous stasis ulcers. Patient has had this for many years. He doesn't think they have gotten worse, but feels that the erythema may be worse. He takes a steroid cream at home for management. Denies fevers, bleeding from the site, abscesses. Patient is a 48 yo M with PMHx of alcohol dependence presents today for 1 mo of anxiety. Patient was released from rehab 1 month ago where he took prozac for management of anxiety/depression. However, when he left rehab, he was unable to make an appointment with psych and has been off for 1 month. Over the past two weeks, he has had increased anxiety with palpitations, nausea, vomiting. Denies SI/HI. This morning, to calm his nerves, he drank 5 beers, then took a nap before coming to the ED. Last drink was @ 930am. Patient also had b/l venous stasis ulcers. Patient has had this for many years. He doesn't think they have gotten worse, but feels that the erythema may be worse. He takes a steroid cream at home for management. Denies fevers, bleeding from the site, abscesses. 15:17 Luc att: 47yoM h/o etoh dependance c/o worsening anxiety. Patient is a 46 yo M with PMHx of alcohol dependence presents today for 1 mo of anxiety. Patient was released from rehab 1 month ago where he took prozac for management of anxiety/depression. However, when he left rehab, he was unable to make an appointment with psych and has been off for 1 month. Over the past two weeks, he has had increased anxiety with palpitations, nausea, vomiting. Denies SI/HI. This morning, to calm his nerves, he drank 5 beers, then took a nap before coming to the ED. Last drink was @ 930am. Patient also had b/l venous stasis ulcers. Patient has had this for many years. He doesn't think they have gotten worse, but feels that the erythema may be worse. He takes a steroid cream at home for management. Denies fevers, bleeding from the site, abscesses.   15:17 Calle att: 47yoM h/o etoh dependance c/o worsening anxiety. One month ago patient was admitted to [facility] for rehab, at rehab patient rec'd Prozac 60 mg qd which helped. Following discharge patient had difficulty establishing outpatient care, unable to acquire script for Prozac, notes worsening of his anxiety depression. Specifies "butterfly in stomach" sensation, generalized anxiety, difficulty with daily activities. Denies hopelessness, guilt, si, hi, ah, suicidal plan or prior suicidal attempts. Today approx 9-09:30AM patient ingested 5 beers. Per fam not a daily drinker. Denies tremor, denies h/o etoh withdrawl admission. Came to ED to see a psychiatrist regarding the anxiety and Prozac script.

## 2017-08-24 NOTE — ED PROVIDER NOTE - ATTENDING CONTRIBUTION TO CARE
Dr. Calle: I have personally seen and examined this patient at the bedside. I have fully participated in the care of this patient. I have reviewed all pertinent clinical information, including history, physical exam, plan and the Resident's note and agree except as noted. HPI above as by me. PE above as by me. 47M h/o etoh abuse presents requesting psychiatric evaluation of increased anxiety. Neg si/hi/ah. Unremarkable physical exam, neg tremor or tongue fasiculation. Gait steady and unassisted. Plan as coordinate with Psych crisis center, patient to be discharged and seen promptly by outpatient psych for recommendations.

## 2017-08-24 NOTE — ED PROVIDER NOTE - PHYSICAL EXAMINATION
Luc att: nad, aaox3, cooperative, answers all questions, follows commands. Perrl, eomi, cn2-12 intact, neg tongue fasiculations, neg ue tremor, ctabl, rrr, s1s2, abd soft ntnd, neg le edema

## 2017-08-25 DIAGNOSIS — F10.20 ALCOHOL DEPENDENCE, UNCOMPLICATED: ICD-10-CM

## 2017-09-01 PROCEDURE — G9001: CPT

## 2017-09-21 PROCEDURE — 90853 GROUP PSYCHOTHERAPY: CPT

## 2017-09-21 PROCEDURE — 90833 PSYTX W PT W E/M 30 MIN: CPT

## 2017-09-24 ENCOUNTER — EMERGENCY (EMERGENCY)
Facility: HOSPITAL | Age: 47
LOS: 1 days | Discharge: HOME | End: 2017-09-24
Attending: EMERGENCY MEDICINE | Admitting: EMERGENCY MEDICINE
Payer: MEDICAID

## 2017-09-24 VITALS
OXYGEN SATURATION: 97 % | SYSTOLIC BLOOD PRESSURE: 142 MMHG | DIASTOLIC BLOOD PRESSURE: 98 MMHG | TEMPERATURE: 98 F | RESPIRATION RATE: 16 BRPM | HEART RATE: 111 BPM

## 2017-09-24 DIAGNOSIS — K57.92 DIVERTICULITIS OF INTESTINE, PART UNSPECIFIED, WITHOUT PERFORATION OR ABSCESS WITHOUT BLEEDING: Chronic | ICD-10-CM

## 2017-09-24 DIAGNOSIS — Z98.89 OTHER SPECIFIED POSTPROCEDURAL STATES: Chronic | ICD-10-CM

## 2017-09-24 PROCEDURE — 12011 RPR F/E/E/N/L/M 2.5 CM/<: CPT

## 2017-09-24 PROCEDURE — 12001 RPR S/N/AX/GEN/TRNK 2.5CM/<: CPT | Mod: 59

## 2017-09-24 PROCEDURE — 99285 EMERGENCY DEPT VISIT HI MDM: CPT | Mod: 25

## 2017-09-24 RX ORDER — SODIUM CHLORIDE 9 MG/ML
1000 INJECTION INTRAMUSCULAR; INTRAVENOUS; SUBCUTANEOUS ONCE
Qty: 0 | Refills: 0 | Status: COMPLETED | OUTPATIENT
Start: 2017-09-24 | End: 2017-09-24

## 2017-09-24 RX ORDER — TETANUS TOXOID, REDUCED DIPHTHERIA TOXOID AND ACELLULAR PERTUSSIS VACCINE, ADSORBED 5; 2.5; 8; 8; 2.5 [IU]/.5ML; [IU]/.5ML; UG/.5ML; UG/.5ML; UG/.5ML
0.5 SUSPENSION INTRAMUSCULAR ONCE
Qty: 0 | Refills: 0 | Status: COMPLETED | OUTPATIENT
Start: 2017-09-24 | End: 2017-09-24

## 2017-09-24 RX ORDER — IBUPROFEN 200 MG
600 TABLET ORAL ONCE
Qty: 0 | Refills: 0 | Status: COMPLETED | OUTPATIENT
Start: 2017-09-24 | End: 2017-09-24

## 2017-09-24 RX ADMIN — Medication 2 MILLIGRAM(S): at 17:25

## 2017-09-24 RX ADMIN — SODIUM CHLORIDE 1000 MILLILITER(S): 9 INJECTION INTRAMUSCULAR; INTRAVENOUS; SUBCUTANEOUS at 16:35

## 2017-09-24 RX ADMIN — SODIUM CHLORIDE 1000 MILLILITER(S): 9 INJECTION INTRAMUSCULAR; INTRAVENOUS; SUBCUTANEOUS at 17:59

## 2017-09-24 RX ADMIN — Medication 1 MILLIGRAM(S): at 17:55

## 2017-09-24 RX ADMIN — Medication 600 MILLIGRAM(S): at 16:37

## 2017-09-24 RX ADMIN — TETANUS TOXOID, REDUCED DIPHTHERIA TOXOID AND ACELLULAR PERTUSSIS VACCINE, ADSORBED 0.5 MILLILITER(S): 5; 2.5; 8; 8; 2.5 SUSPENSION INTRAMUSCULAR at 17:00
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
## 2017-09-24 NOTE — ED PROVIDER NOTE - PROGRESS NOTE DETAILS
Vasile: pt refusing xray. Vasile: pt refusing xray. feeling much better. ED Attending Dr. Fry: HR 96 at time of discharge (improved from presentation), pt noting feeling improvement s/p ED treatment

## 2017-09-24 NOTE — ED PROVIDER NOTE - LOCATION
arm/bruising and tenderness. full ROM elbow/small laceration and abrasions, bruising and tenderness, full ROM

## 2017-09-24 NOTE — ED PROCEDURE NOTE - ATTENDING CONTRIBUTION TO CARE
Dr. Fry: I personally supervised this procedure performed by the resident and I agree with the above documentation.

## 2017-09-24 NOTE — ED PROVIDER NOTE - ENMT, MLM
Airway patent, Nasal mucosa clear. Mouth with swelling of upper lip with very small laceration to inner upper lip.. no dental trauma or loose teeth. Throat has no vesicles, no oropharyngeal exudates and uvula is midline.
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
## 2017-10-16 ENCOUNTER — EMERGENCY (EMERGENCY)
Facility: HOSPITAL | Age: 47
LOS: 1 days | Discharge: HOME | End: 2017-10-16
Attending: EMERGENCY MEDICINE | Admitting: EMERGENCY MEDICINE
Payer: MEDICAID

## 2017-10-16 VITALS
OXYGEN SATURATION: 99 % | SYSTOLIC BLOOD PRESSURE: 146 MMHG | TEMPERATURE: 99 F | HEART RATE: 89 BPM | DIASTOLIC BLOOD PRESSURE: 80 MMHG | RESPIRATION RATE: 18 BRPM

## 2017-10-16 DIAGNOSIS — Z98.89 OTHER SPECIFIED POSTPROCEDURAL STATES: Chronic | ICD-10-CM

## 2017-10-16 DIAGNOSIS — K57.92 DIVERTICULITIS OF INTESTINE, PART UNSPECIFIED, WITHOUT PERFORATION OR ABSCESS WITHOUT BLEEDING: Chronic | ICD-10-CM

## 2017-10-16 PROCEDURE — 99281 EMR DPT VST MAYX REQ PHY/QHP: CPT

## 2017-10-16 RX ORDER — SODIUM CHLORIDE 9 MG/ML
1000 INJECTION INTRAMUSCULAR; INTRAVENOUS; SUBCUTANEOUS ONCE
Qty: 0 | Refills: 0 | Status: DISCONTINUED | OUTPATIENT
Start: 2017-10-16 | End: 2017-10-16
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
## 2017-10-16 NOTE — ED PROVIDER NOTE - SKIN WOUND DESCRIPTION
healed wound to left eyebrow area, healed wound to right elbow, no surrounding erythema or discharge, sutures in place

## 2017-10-16 NOTE — ED PROVIDER NOTE - PROGRESS NOTE DETAILS
Sutures from face and elbow removed and dressed with Bacitracin. Pt was offered resources for detox. No tachycardia or tongue fasciculations. Pt left prior to discharge papers given.

## 2017-10-16 NOTE — ED ADULT TRIAGE NOTE - CHIEF COMPLAINT QUOTE
Pt returns to ED to have stitches removed from right elbow and above left eye. Denies any other medical complaints at this time.

## 2017-10-16 NOTE — ED PROVIDER NOTE - MEDICAL DECISION MAKING DETAILS
46 y/o M presents for suture removal. Wounds healing well, no signs of infections. Suture removed. Pt sent to [facility] for alcohol withdrawal, currently shaking in all extremities.

## 2017-12-01 ENCOUNTER — TRANSCRIPTION ENCOUNTER (OUTPATIENT)
Age: 47
End: 2017-12-01

## 2018-03-01 ENCOUNTER — OUTPATIENT (OUTPATIENT)
Dept: OUTPATIENT SERVICES | Facility: HOSPITAL | Age: 48
LOS: 1 days | End: 2018-03-01
Payer: MEDICAID

## 2018-03-01 DIAGNOSIS — K57.92 DIVERTICULITIS OF INTESTINE, PART UNSPECIFIED, WITHOUT PERFORATION OR ABSCESS WITHOUT BLEEDING: Chronic | ICD-10-CM

## 2018-03-01 DIAGNOSIS — Z98.89 OTHER SPECIFIED POSTPROCEDURAL STATES: Chronic | ICD-10-CM

## 2018-03-01 PROCEDURE — G9001: CPT

## 2018-03-05 DIAGNOSIS — R69 ILLNESS, UNSPECIFIED: ICD-10-CM

## 2018-03-06 NOTE — H&P ADULT. - PROBLEM SELECTOR PLAN 1
- Pt received valium in ED and ciwa is still 14. Will give dose librium and start ativan taper and monitor ciwa. ativan prn ciwa>8  - Continue banana bag V-Y Plasty Text: The defect edges were debeveled with a #15 scalpel blade.  Given the location of the defect, shape of the defect and the proximity to free margins an V-Y advancement flap was deemed most appropriate.  Using a sterile surgical marker, an appropriate advancement flap was drawn incorporating the defect and placing the expected incisions within the relaxed skin tension lines where possible.    The area thus outlined was incised deep to adipose tissue with a #15 scalpel blade.  The skin margins were undermined to an appropriate distance in all directions utilizing iris scissors.

## 2018-04-02 NOTE — ED PROVIDER NOTE - MEDICAL DECISION MAKING DETAILS
normal (ped)... 47 yo man w/ worsening rash unable to secure proper outpt f/u, also in mild etoh withdrawal w/ CIWA 9. Will check labs, CXR, treat w/ diazepam, admit for withdrawal and further derm workup.

## 2020-02-20 NOTE — ED ADULT NURSE NOTE - FALL HARM RISK CONCLUSION
2/18/2020  I, Jojo Cardenas DO, saw and evaluated the patient  I have discussed the patient with the resident/non-physician practitioner and agree with the resident's/non-physician practitioner's findings, Plan of Care, and MDM as documented in the resident's/non-physician practitioner's note, except where noted  All available labs and Radiology studies were reviewed  I was present for key portions of any procedure(s) performed by the resident/non-physician practitioner and I was immediately available to provide assistance  At this point I agree with the current assessment done in the Emergency Department  I have conducted an independent evaluation of this patient a history and physical is as follows:    44 yom with left pinky laceration, palmar aspect  Sharp object  Tetanus updated  [employer]  Flexor tendon not visualized, FROM  Primary repair      ED Course         Critical Care Time  Procedures
Fall Risk

## 2021-06-18 NOTE — H&P ADULT. - NS PRO FEM  PAP SMEARS 3YRS
XR CHEST 1V



History: Reason: S/P BRONCH / Spl. Instructions:  / History: 



Comparison: June 17, 2021



Findings:

Increased right basilar consolidations and pleural effusion. Right upper lung consolidation opacity a
nd adjacent fibrotic changes, unchanged. Right upper lung linear bleb or extrapleural air, similar co
mpared to prior. Right midlung patchy opacities, unchanged. Prior granulomatous disease within the ch
est. Stable tracheostomy tube. Unchanged heart size. No pneumothorax. Chronic left-sided rib fracture
.



Impression: 

1.  Increased right basilar consolidations and pleural effusion.



Electronically signed by: Latrell Bocanegra DO (6/18/2021 9:01 AM) [id number] not applicable (Male)

## 2021-12-10 ENCOUNTER — EMERGENCY (EMERGENCY)
Facility: HOSPITAL | Age: 51
LOS: 1 days | Discharge: HOME | End: 2021-12-10
Attending: EMERGENCY MEDICINE | Admitting: EMERGENCY MEDICINE
Payer: MEDICAID

## 2021-12-10 VITALS
OXYGEN SATURATION: 100 % | SYSTOLIC BLOOD PRESSURE: 132 MMHG | DIASTOLIC BLOOD PRESSURE: 71 MMHG | RESPIRATION RATE: 16 BRPM | HEART RATE: 103 BPM | TEMPERATURE: 98 F

## 2021-12-10 VITALS
DIASTOLIC BLOOD PRESSURE: 66 MMHG | RESPIRATION RATE: 16 BRPM | HEART RATE: 108 BPM | HEIGHT: 70 IN | TEMPERATURE: 98 F | SYSTOLIC BLOOD PRESSURE: 108 MMHG | OXYGEN SATURATION: 100 %

## 2021-12-10 DIAGNOSIS — Z98.89 OTHER SPECIFIED POSTPROCEDURAL STATES: Chronic | ICD-10-CM

## 2021-12-10 DIAGNOSIS — K57.92 DIVERTICULITIS OF INTESTINE, PART UNSPECIFIED, WITHOUT PERFORATION OR ABSCESS WITHOUT BLEEDING: Chronic | ICD-10-CM

## 2021-12-10 LAB
ALBUMIN SERPL ELPH-MCNC: 5.2 G/DL — HIGH (ref 3.3–5)
ALP SERPL-CCNC: 77 U/L — SIGNIFICANT CHANGE UP (ref 40–120)
ALT FLD-CCNC: 31 U/L — SIGNIFICANT CHANGE UP (ref 4–41)
ANION GAP SERPL CALC-SCNC: 13 MMOL/L — SIGNIFICANT CHANGE UP (ref 7–14)
AST SERPL-CCNC: 34 U/L — SIGNIFICANT CHANGE UP (ref 4–40)
BASOPHILS # BLD AUTO: 0.04 K/UL — SIGNIFICANT CHANGE UP (ref 0–0.2)
BASOPHILS NFR BLD AUTO: 0.7 % — SIGNIFICANT CHANGE UP (ref 0–2)
BILIRUB SERPL-MCNC: <0.2 MG/DL — SIGNIFICANT CHANGE UP (ref 0.2–1.2)
BUN SERPL-MCNC: 13 MG/DL — SIGNIFICANT CHANGE UP (ref 7–23)
CALCIUM SERPL-MCNC: 9.6 MG/DL — SIGNIFICANT CHANGE UP (ref 8.4–10.5)
CHLORIDE SERPL-SCNC: 105 MMOL/L — SIGNIFICANT CHANGE UP (ref 98–107)
CO2 SERPL-SCNC: 25 MMOL/L — SIGNIFICANT CHANGE UP (ref 22–31)
CREAT SERPL-MCNC: 1.04 MG/DL — SIGNIFICANT CHANGE UP (ref 0.5–1.3)
EOSINOPHIL # BLD AUTO: 0.09 K/UL — SIGNIFICANT CHANGE UP (ref 0–0.5)
EOSINOPHIL NFR BLD AUTO: 1.5 % — SIGNIFICANT CHANGE UP (ref 0–6)
GLUCOSE SERPL-MCNC: 76 MG/DL — SIGNIFICANT CHANGE UP (ref 70–99)
HCT VFR BLD CALC: 44.8 % — SIGNIFICANT CHANGE UP (ref 39–50)
HGB BLD-MCNC: 14.7 G/DL — SIGNIFICANT CHANGE UP (ref 13–17)
IANC: 2.18 K/UL — SIGNIFICANT CHANGE UP (ref 1.5–8.5)
IMM GRANULOCYTES NFR BLD AUTO: 0.7 % — SIGNIFICANT CHANGE UP (ref 0–1.5)
LYMPHOCYTES # BLD AUTO: 3.21 K/UL — SIGNIFICANT CHANGE UP (ref 1–3.3)
LYMPHOCYTES # BLD AUTO: 53.9 % — HIGH (ref 13–44)
MCHC RBC-ENTMCNC: 31.3 PG — SIGNIFICANT CHANGE UP (ref 27–34)
MCHC RBC-ENTMCNC: 32.8 GM/DL — SIGNIFICANT CHANGE UP (ref 32–36)
MCV RBC AUTO: 95.5 FL — SIGNIFICANT CHANGE UP (ref 80–100)
MONOCYTES # BLD AUTO: 0.4 K/UL — SIGNIFICANT CHANGE UP (ref 0–0.9)
MONOCYTES NFR BLD AUTO: 6.7 % — SIGNIFICANT CHANGE UP (ref 2–14)
NEUTROPHILS # BLD AUTO: 2.18 K/UL — SIGNIFICANT CHANGE UP (ref 1.8–7.4)
NEUTROPHILS NFR BLD AUTO: 36.5 % — LOW (ref 43–77)
NRBC # BLD: 0 /100 WBCS — SIGNIFICANT CHANGE UP
NRBC # FLD: 0 K/UL — SIGNIFICANT CHANGE UP
PLATELET # BLD AUTO: 259 K/UL — SIGNIFICANT CHANGE UP (ref 150–400)
POTASSIUM SERPL-MCNC: 4.2 MMOL/L — SIGNIFICANT CHANGE UP (ref 3.5–5.3)
POTASSIUM SERPL-SCNC: 4.2 MMOL/L — SIGNIFICANT CHANGE UP (ref 3.5–5.3)
PROT SERPL-MCNC: 8.3 G/DL — SIGNIFICANT CHANGE UP (ref 6–8.3)
RBC # BLD: 4.69 M/UL — SIGNIFICANT CHANGE UP (ref 4.2–5.8)
RBC # FLD: 13.2 % — SIGNIFICANT CHANGE UP (ref 10.3–14.5)
SODIUM SERPL-SCNC: 143 MMOL/L — SIGNIFICANT CHANGE UP (ref 135–145)
WBC # BLD: 5.96 K/UL — SIGNIFICANT CHANGE UP (ref 3.8–10.5)
WBC # FLD AUTO: 5.96 K/UL — SIGNIFICANT CHANGE UP (ref 3.8–10.5)

## 2021-12-10 PROCEDURE — 99284 EMERGENCY DEPT VISIT MOD MDM: CPT

## 2021-12-10 PROCEDURE — 71046 X-RAY EXAM CHEST 2 VIEWS: CPT | Mod: 26

## 2021-12-10 NOTE — ED PROVIDER NOTE - ATTENDING CONTRIBUTION TO CARE
I performed a face-to-face evaluation of the patient and performed a history and physical examination. I agree with the history and physical examination.    H/o bradycardia, has defibrillator. Irritation at that site w/ swelling there. No red/pus. Still has occasional dizziness. Swelling around pacemaker. Pt. came for second opinion by a Cards. Will check CBC, CMP, CXR. Refer to Cards.

## 2021-12-10 NOTE — ED PROVIDER NOTE - NSICDXPASTSURGICALHX_GEN_ALL_CORE_FT
PAST SURGICAL HISTORY:  Diverticulitis s/p Ileostomy 01/2014    History of colon surgery     No Past Surgical History

## 2021-12-10 NOTE — ED ADULT NURSE NOTE - CHIEF COMPLAINT QUOTE
requesting 2nd opinion. states defibrillator  since march. [facility]- area looks red as stated. denies fever reports increased dizziness x past  3 mos.  reports intermittent diff breathing with ch pains.  fs 97

## 2021-12-10 NOTE — ED PROVIDER NOTE - OBJECTIVE STATEMENT
51 Y M pladced in march at [facility] for defibrillator placement due to SOBm intermittent dizziness, bradycardia... Presenting due to concerns due to increased bulging from pacemaker site, increased tenderness and swelling on the chest. States intermittent erythema, . States several months ago defibrillator initaited after a period of SOB, nausea, States intermittent hypothermia, Denies peripheral loss of sensation   Placed by dr. Schmid - [phone]

## 2021-12-10 NOTE — ED ADULT TRIAGE NOTE - CHIEF COMPLAINT QUOTE
requesting 2nd opinion. states defibrillator  since march. [facility]- area looks red as stated. denies fever reports increased dizziness x past  3 mos.  reports intermittent diff breathing with ch pains. requesting 2nd opinion. states defibrillator  since march. [facility]- area looks red as stated. denies fever reports increased dizziness x past  3 mos.  reports intermittent diff breathing with ch pains.  fs 97

## 2021-12-10 NOTE — ED ADULT NURSE NOTE - OBJECTIVE STATEMENT
Facilitator RN- Pt presenting to ED for issues with defibrillator. Pt states he had defibrillator placed 8 months ago and feels like its "sticking out" Pt was prescribed steroid cream by cardiologist who place it with no relief. Device noted to be protruding and mild redness to incision site noted. Area tender to touch in ED. Pt also c/o intermittent dizziness but states that has been ongoing. Pt denies cp. Pt placed on monitor, NSR noted. Pt will need MD DAVID made aware. Area RN made aware of pt situation.

## 2021-12-10 NOTE — ED PROVIDER NOTE - CLINICAL SUMMARY MEDICAL DECISION MAKING FREE TEXT BOX
51 Y M H/O See: H/o bradycardia, has defibrillator. Irritation at that site w/ swelling there. No red/pus. Still has occasional dizziness. Swelling around pacemaker. Pt. came for second opinion by a Cards. Will check CBC, CMP, CXR. Refer to Cards.

## 2021-12-10 NOTE — ED PROVIDER NOTE - NSFOLLOWUPINSTRUCTIONS_ED_ALL_ED_FT
Rash    A rash is a change in the color of the skin. A rash can also change the way your skin feels. There are many different conditions and factors that can cause a rash, most of which are not dangerous. Make sure to follow up with your primary care physician or a dermatologist as instructed by your health care provider.    Stop applying steroid creams, followup with dermatology for continued care. Follow up with the above cardiologists if you prefer alternative cardiology care.    SEEK IMMEDIATE MEDICAL CARE IF YOU HAVE ANY OF THE FOLLOWING SYMPTOMS: fever, blisters, a rash inside your mouth, vaginal or anal pain, or altered mental status.

## 2021-12-10 NOTE — ED PROVIDER NOTE - NS ED ROS FT
GENERAL: No fever or chills  EYES: No change in vision  HEENT: No trouble swallowing or speaking  CARDIAC: + swelling over the L chest, + intermittent soreness over the implanted defibrillator  PULMONARY: No cough or SOB  GI: No abdominal pain, no nausea or no vomiting, no diarrhea or constipation  SKIN: No rashes  NEURO: No headache, no numbness  MSK: No joint pain  Otherwise as HPI or negative. Spontaneous, unlabored and symmetrical

## 2021-12-10 NOTE — ED PROVIDER NOTE - NSICDXFAMILYHX_GEN_ALL_CORE_FT
FAMILY HISTORY:  Uncle  Still living? Unknown  Family history of stomach cancer, Age at diagnosis: Age Unknown

## 2021-12-10 NOTE — ED ADULT NURSE REASSESSMENT NOTE - NS ED NURSE REASSESS COMMENT FT1
Received pt from day RN Fannie, pt A&Ox4, ambulatory, respirations even and unlabored b/l. Pt with no complaints, NAD at this time. Awaiting labR. Will continue to monitor.

## 2021-12-10 NOTE — ED PROVIDER NOTE - NSFOLLOWUPCLINICS_GEN_ALL_ED_FT
[facility] - [place]  Dermatology  [address], [address]  [place] [address]  Phone: [phone]  Fax: [phone]    [facility]  Cardiology  [address] [place] [address]  Phone: [phone]  Fax:

## 2021-12-10 NOTE — ED PROVIDER NOTE - PATIENT PORTAL LINK FT
You can access the FollowMyHealth Patient Portal offered by [facility] by registering at the following website: [email or web address]. By joining [employer]’s FollowMyHealth portal, you will also be able to view your health information using other applications (apps) compatible with our system.

## 2023-09-11 NOTE — ED PROVIDER NOTE - IV ALTEPLASE EXCL REL HIDDEN
Patient VS stable, resting comfortably and breathing unlabored in cart. Discharge education and paperwork provided to patient's family. Patient's provided with indications to return to PED, verbalized understanding of instruction, and denies any further questions or concerns at this time. Instructed to follow-up with PMD.Patient appropriate for discharge at this time, per ERMD.   show

## 2023-12-08 NOTE — PATIENT PROFILE ADULT. - ALCOHOL USE HISTORY SINGLE SELECT
----- Message from Kiarra Stroud sent at 12/8/2023  2:02 PM CST -----  Contact: Pt's frined  Type:  Patient Returning Call    Who Called:  Pt's friend  Who Left Message for Patient:  Prema  Does the patient know what this is regarding?:  no  Best Call Back Number: [phone]  Additional Information:  Please call the patient back at the phone number listed above to advise. Thank you!        used yes...

## 2025-01-23 NOTE — PHYSICAL THERAPY INITIAL EVALUATION ADULT - THERAPY FREQUENCY, PT EVAL
Increase atorvastatin from 20 to 40 mg daily  Fasting cholesterol blood work 2 months after increasing dose  Check BP twice daily for 1 week, then upload measurements to the [employer] maxine    Buy an electronic upper arm blood pressure cuff that fits your arm. [employer] is a good brand. Visit validateBP.org to see if your cuff has been checked for accuracy.  Check your blood pressure (BP) once in the morning before taking medications and once in the evening before going to bed. Do not eat or consume caffeine for 30 minutes before checking your BP. Make sure that your bladder is empty.  Sit in a chair with your feet flat on the floor, your back against the chair, and your arm supported at the level of the heart.  Place the cuff on a bare upper arm.   Rest for a full 5 minutes.   Check your BP two times with one minute between measurements. Do not talk while measuring. Write down the results.   Message or call me once you have 1 week's worth of measurements.  Check your BP twice daily for 1 week before appointments with me and your primary care physician.    Bring your blood pressure cuff into clinic once a year to check its accuracy.     
3-5x/week

## 2025-08-04 ENCOUNTER — EMERGENCY (EMERGENCY)
Facility: HOSPITAL | Age: 55
LOS: 1 days | End: 2025-08-04
Attending: STUDENT IN AN ORGANIZED HEALTH CARE EDUCATION/TRAINING PROGRAM | Admitting: STUDENT IN AN ORGANIZED HEALTH CARE EDUCATION/TRAINING PROGRAM
Payer: MEDICAID

## 2025-08-04 VITALS
SYSTOLIC BLOOD PRESSURE: 112 MMHG | DIASTOLIC BLOOD PRESSURE: 69 MMHG | HEART RATE: 92 BPM | OXYGEN SATURATION: 100 % | RESPIRATION RATE: 16 BRPM | TEMPERATURE: 98 F

## 2025-08-04 VITALS
HEART RATE: 95 BPM | RESPIRATION RATE: 18 BRPM | WEIGHT: 145.06 LBS | OXYGEN SATURATION: 95 % | SYSTOLIC BLOOD PRESSURE: 105 MMHG | DIASTOLIC BLOOD PRESSURE: 76 MMHG | TEMPERATURE: 98 F | HEIGHT: 66 IN

## 2025-08-04 DIAGNOSIS — Z98.89 OTHER SPECIFIED POSTPROCEDURAL STATES: Chronic | ICD-10-CM

## 2025-08-04 DIAGNOSIS — K57.92 DIVERTICULITIS OF INTESTINE, PART UNSPECIFIED, WITHOUT PERFORATION OR ABSCESS WITHOUT BLEEDING: Chronic | ICD-10-CM

## 2025-08-04 LAB
ALBUMIN SERPL ELPH-MCNC: 3.9 G/DL — SIGNIFICANT CHANGE UP (ref 3.3–5)
ALP SERPL-CCNC: 174 U/L — HIGH (ref 40–120)
ALT FLD-CCNC: 33 U/L — SIGNIFICANT CHANGE UP (ref 4–41)
ANION GAP SERPL CALC-SCNC: 14 MMOL/L — SIGNIFICANT CHANGE UP (ref 7–14)
AST SERPL-CCNC: 55 U/L — HIGH (ref 4–40)
BASOPHILS # BLD AUTO: 0.04 K/UL — SIGNIFICANT CHANGE UP (ref 0–0.2)
BASOPHILS NFR BLD AUTO: 0.5 % — SIGNIFICANT CHANGE UP (ref 0–2)
BILIRUB SERPL-MCNC: <0.2 MG/DL — SIGNIFICANT CHANGE UP (ref 0.2–1.2)
BUN SERPL-MCNC: 8 MG/DL — SIGNIFICANT CHANGE UP (ref 7–23)
CALCIUM SERPL-MCNC: 9 MG/DL — SIGNIFICANT CHANGE UP (ref 8.4–10.5)
CHLORIDE SERPL-SCNC: 100 MMOL/L — SIGNIFICANT CHANGE UP (ref 98–107)
CO2 SERPL-SCNC: 22 MMOL/L — SIGNIFICANT CHANGE UP (ref 22–31)
CREAT SERPL-MCNC: 0.77 MG/DL — SIGNIFICANT CHANGE UP (ref 0.5–1.3)
EGFR: 106 ML/MIN/1.73M2 — SIGNIFICANT CHANGE UP
EGFR: 106 ML/MIN/1.73M2 — SIGNIFICANT CHANGE UP
EOSINOPHIL # BLD AUTO: 0.27 K/UL — SIGNIFICANT CHANGE UP (ref 0–0.5)
EOSINOPHIL NFR BLD AUTO: 3.6 % — SIGNIFICANT CHANGE UP (ref 0–6)
GLUCOSE SERPL-MCNC: 85 MG/DL — SIGNIFICANT CHANGE UP (ref 70–99)
HCT VFR BLD CALC: 42.7 % — SIGNIFICANT CHANGE UP (ref 39–50)
HGB BLD-MCNC: 14.6 G/DL — SIGNIFICANT CHANGE UP (ref 13–17)
IMM GRANULOCYTES # BLD AUTO: 0.06 K/UL — SIGNIFICANT CHANGE UP (ref 0–0.07)
IMM GRANULOCYTES NFR BLD AUTO: 0.8 % — SIGNIFICANT CHANGE UP (ref 0–0.9)
LYMPHOCYTES # BLD AUTO: 1.92 K/UL — SIGNIFICANT CHANGE UP (ref 1–3.3)
LYMPHOCYTES NFR BLD AUTO: 25.6 % — SIGNIFICANT CHANGE UP (ref 13–44)
MAGNESIUM SERPL-MCNC: 2 MG/DL — SIGNIFICANT CHANGE UP (ref 1.6–2.6)
MCHC RBC-ENTMCNC: 32.4 PG — SIGNIFICANT CHANGE UP (ref 27–34)
MCHC RBC-ENTMCNC: 34.2 G/DL — SIGNIFICANT CHANGE UP (ref 32–36)
MCV RBC AUTO: 94.7 FL — SIGNIFICANT CHANGE UP (ref 80–100)
MONOCYTES # BLD AUTO: 0.51 K/UL — SIGNIFICANT CHANGE UP (ref 0–0.9)
MONOCYTES NFR BLD AUTO: 6.8 % — SIGNIFICANT CHANGE UP (ref 2–14)
NEUTROPHILS # BLD AUTO: 4.7 K/UL — SIGNIFICANT CHANGE UP (ref 1.8–7.4)
NEUTROPHILS NFR BLD AUTO: 62.7 % — SIGNIFICANT CHANGE UP (ref 43–77)
NRBC # BLD AUTO: 0 K/UL — SIGNIFICANT CHANGE UP (ref 0–0)
NRBC # FLD: 0 K/UL — SIGNIFICANT CHANGE UP (ref 0–0)
PHOSPHATE SERPL-MCNC: 3.3 MG/DL — SIGNIFICANT CHANGE UP (ref 2.5–4.5)
PLATELET # BLD AUTO: 230 K/UL — SIGNIFICANT CHANGE UP (ref 150–400)
PMV BLD: SIGNIFICANT CHANGE UP FL (ref 7–13)
POTASSIUM SERPL-MCNC: 5 MMOL/L — SIGNIFICANT CHANGE UP (ref 3.5–5.3)
POTASSIUM SERPL-SCNC: 5 MMOL/L — SIGNIFICANT CHANGE UP (ref 3.5–5.3)
PROT SERPL-MCNC: 7.6 G/DL — SIGNIFICANT CHANGE UP (ref 6–8.3)
RBC # BLD: 4.51 M/UL — SIGNIFICANT CHANGE UP (ref 4.2–5.8)
RBC # FLD: SIGNIFICANT CHANGE UP % (ref 10.3–14.5)
SODIUM SERPL-SCNC: 136 MMOL/L — SIGNIFICANT CHANGE UP (ref 135–145)
WBC # BLD: 7.5 K/UL — SIGNIFICANT CHANGE UP (ref 3.8–10.5)
WBC # FLD AUTO: 7.5 K/UL — SIGNIFICANT CHANGE UP (ref 3.8–10.5)

## 2025-08-04 PROCEDURE — 99285 EMERGENCY DEPT VISIT HI MDM: CPT

## 2025-08-04 RX ORDER — B1/B2/B3/B5/B6/B12/VIT C/FOLIC 500-0.5 MG
1 TABLET ORAL ONCE
Refills: 0 | Status: COMPLETED | OUTPATIENT
Start: 2025-08-04 | End: 2025-08-04

## 2025-08-04 RX ORDER — CHLORDIAZEPOXIDE HCL 10 MG
50 CAPSULE ORAL ONCE
Refills: 0 | Status: DISCONTINUED | OUTPATIENT
Start: 2025-08-04 | End: 2025-08-04

## 2025-08-04 RX ORDER — DOXYCYCLINE HYCLATE 100 MG
100 TABLET ORAL ONCE
Refills: 0 | Status: COMPLETED | OUTPATIENT
Start: 2025-08-04 | End: 2025-08-04

## 2025-08-04 RX ORDER — DOXYCYCLINE HYCLATE 100 MG
100 TABLET ORAL ONCE
Refills: 0 | Status: DISCONTINUED | OUTPATIENT
Start: 2025-08-04 | End: 2025-08-07

## 2025-08-04 RX ORDER — FOLIC ACID 1 MG/1
1 TABLET ORAL DAILY
Refills: 0 | Status: DISCONTINUED | OUTPATIENT
Start: 2025-08-04 | End: 2025-08-07

## 2025-08-04 RX ORDER — DOXYCYCLINE HYCLATE 100 MG
1 TABLET ORAL
Qty: 14 | Refills: 0
Start: 2025-08-04 | End: 2025-08-10

## 2025-08-04 RX ORDER — ACETAMINOPHEN 500 MG/5ML
1000 LIQUID (ML) ORAL ONCE
Refills: 0 | Status: COMPLETED | OUTPATIENT
Start: 2025-08-04 | End: 2025-08-04

## 2025-08-04 RX ADMIN — Medication 100 MILLIGRAM(S): at 14:25

## 2025-08-04 RX ADMIN — Medication 1000 MILLILITER(S): at 12:08

## 2025-08-04 RX ADMIN — Medication 400 MILLIGRAM(S): at 12:08

## 2025-08-04 RX ADMIN — Medication 100 MILLIGRAM(S): at 12:08

## 2025-08-04 RX ADMIN — FOLIC ACID 1 MILLIGRAM(S): 1 TABLET ORAL at 12:07

## 2025-08-04 RX ADMIN — Medication 1 TABLET(S): at 12:07

## 2025-08-04 RX ADMIN — Medication 50 MILLIGRAM(S): at 12:07

## 2025-08-06 PROCEDURE — 76937 US GUIDE VASCULAR ACCESS: CPT | Mod: 26
